# Patient Record
Sex: MALE | Race: WHITE | Employment: OTHER | URBAN - METROPOLITAN AREA
[De-identification: names, ages, dates, MRNs, and addresses within clinical notes are randomized per-mention and may not be internally consistent; named-entity substitution may affect disease eponyms.]

---

## 2018-07-06 ENCOUNTER — HOSPITAL ENCOUNTER (OUTPATIENT)
Age: 83
Discharge: HOME | End: 2018-07-06
Payer: MEDICARE

## 2018-07-06 LAB
ALBUMIN SERPL-MCNC: 3.9 G/DL (ref 3.5–5.2)
ALP BLD-CCNC: 80 U/L (ref 40–129)
ALT SERPL-CCNC: 14 U/L (ref 0–40)
ANION GAP SERPL CALCULATED.3IONS-SCNC: 8 MMOL/L (ref 7–16)
AST SERPL-CCNC: 19 U/L (ref 0–39)
BILIRUB SERPL-MCNC: 0.6 MG/DL (ref 0–1.2)
BUN BLDV-MCNC: 16 MG/DL (ref 8–23)
CALCIUM SERPL-MCNC: 9.5 MG/DL (ref 8.6–10.2)
CHLORIDE BLD-SCNC: 102 MMOL/L (ref 98–107)
CHOLESTEROL, FASTING: 158 MG/DL (ref 0–199)
CO2: 32 MMOL/L (ref 22–29)
CREAT SERPL-MCNC: 1 MG/DL (ref 0.7–1.2)
GFR AFRICAN AMERICAN: >60
GFR NON-AFRICAN AMERICAN: >60 ML/MIN/1.73
GLUCOSE FASTING: 90 MG/DL (ref 74–109)
HDLC SERPL-MCNC: 66 MG/DL
LDL CHOLESTEROL CALCULATED: 77 MG/DL (ref 0–99)
POTASSIUM SERPL-SCNC: 4.5 MMOL/L (ref 3.5–5)
PROSTATE SPECIFIC ANTIGEN: 2.65 NG/ML (ref 0–4)
SODIUM BLD-SCNC: 142 MMOL/L (ref 132–146)
TOTAL PROTEIN: 7 G/DL (ref 6.4–8.3)
TRIGLYCERIDE, FASTING: 75 MG/DL (ref 0–149)
VLDLC SERPL CALC-MCNC: 15 MG/DL

## 2018-07-06 PROCEDURE — 80053 COMPREHEN METABOLIC PANEL: CPT

## 2018-07-06 PROCEDURE — 36415 COLL VENOUS BLD VENIPUNCTURE: CPT

## 2018-07-06 PROCEDURE — G0103 PSA SCREENING: HCPCS

## 2018-07-06 PROCEDURE — 80061 LIPID PANEL: CPT

## 2019-02-14 ENCOUNTER — HOSPITAL ENCOUNTER (OUTPATIENT)
Dept: PULMONOLOGY | Age: 84
Discharge: HOME | End: 2019-02-14
Payer: MEDICARE

## 2019-02-14 PROCEDURE — 94060 EVALUATION OF WHEEZING: CPT

## 2019-02-14 PROCEDURE — 94729 DIFFUSING CAPACITY: CPT

## 2019-02-14 PROCEDURE — 94726 PLETHYSMOGRAPHY LUNG VOLUMES: CPT

## 2019-05-14 ENCOUNTER — HOSPITAL ENCOUNTER (OUTPATIENT)
Age: 84
Discharge: HOME | End: 2019-05-16
Payer: MEDICARE

## 2019-05-14 PROCEDURE — 82785 ASSAY OF IGE: CPT

## 2019-05-14 PROCEDURE — 86003 ALLG SPEC IGE CRUDE XTRC EA: CPT

## 2019-05-22 LAB
Lab: NORMAL
REPORT: NORMAL
THIS TEST SENT TO: NORMAL

## 2019-08-09 ENCOUNTER — HOSPITAL ENCOUNTER (OUTPATIENT)
Age: 84
Discharge: HOME | End: 2019-08-11
Payer: MEDICARE

## 2019-08-09 LAB
ALBUMIN SERPL-MCNC: 4 G/DL (ref 3.5–5.2)
ALP BLD-CCNC: 79 U/L (ref 40–129)
ALT SERPL-CCNC: 14 U/L (ref 0–40)
ANION GAP SERPL CALCULATED.3IONS-SCNC: 9 MMOL/L (ref 7–16)
AST SERPL-CCNC: 19 U/L (ref 0–39)
BASOPHILS ABSOLUTE: 0.03 E9/L (ref 0–0.2)
BASOPHILS RELATIVE PERCENT: 0.7 % (ref 0–2)
BILIRUB SERPL-MCNC: 0.5 MG/DL (ref 0–1.2)
BUN BLDV-MCNC: 13 MG/DL (ref 8–23)
CALCIUM SERPL-MCNC: 9.4 MG/DL (ref 8.6–10.2)
CHLORIDE BLD-SCNC: 102 MMOL/L (ref 98–107)
CO2: 30 MMOL/L (ref 22–29)
CREAT SERPL-MCNC: 1 MG/DL (ref 0.7–1.2)
EOSINOPHILS ABSOLUTE: 0.25 E9/L (ref 0.05–0.5)
EOSINOPHILS RELATIVE PERCENT: 5.9 % (ref 0–6)
GFR AFRICAN AMERICAN: >60
GFR NON-AFRICAN AMERICAN: >60 ML/MIN/1.73
GLUCOSE BLD-MCNC: 93 MG/DL (ref 74–99)
HCT VFR BLD CALC: 49.8 % (ref 37–54)
HEMOGLOBIN: 16.1 G/DL (ref 12.5–16.5)
IMMATURE GRANULOCYTES #: 0.02 E9/L
IMMATURE GRANULOCYTES %: 0.5 % (ref 0–5)
LYMPHOCYTES ABSOLUTE: 1.33 E9/L (ref 1.5–4)
LYMPHOCYTES RELATIVE PERCENT: 31.2 % (ref 20–42)
MCH RBC QN AUTO: 28.6 PG (ref 26–35)
MCHC RBC AUTO-ENTMCNC: 32.3 % (ref 32–34.5)
MCV RBC AUTO: 88.5 FL (ref 80–99.9)
MONOCYTES ABSOLUTE: 0.37 E9/L (ref 0.1–0.95)
MONOCYTES RELATIVE PERCENT: 8.7 % (ref 2–12)
NEUTROPHILS ABSOLUTE: 2.26 E9/L (ref 1.8–7.3)
NEUTROPHILS RELATIVE PERCENT: 53 % (ref 43–80)
PDW BLD-RTO: 13.7 FL (ref 11.5–15)
PLATELET # BLD: 144 E9/L (ref 130–450)
PMV BLD AUTO: 11.3 FL (ref 7–12)
POTASSIUM SERPL-SCNC: 4.3 MMOL/L (ref 3.5–5)
RBC # BLD: 5.63 E12/L (ref 3.8–5.8)
SODIUM BLD-SCNC: 141 MMOL/L (ref 132–146)
TOTAL PROTEIN: 7 G/DL (ref 6.4–8.3)
TSH SERPL DL<=0.05 MIU/L-ACNC: 1.73 UIU/ML (ref 0.27–4.2)
WBC # BLD: 4.3 E9/L (ref 4.5–11.5)

## 2019-08-09 PROCEDURE — 80053 COMPREHEN METABOLIC PANEL: CPT

## 2019-08-09 PROCEDURE — 36415 COLL VENOUS BLD VENIPUNCTURE: CPT

## 2019-08-09 PROCEDURE — 84443 ASSAY THYROID STIM HORMONE: CPT

## 2019-08-09 PROCEDURE — 85025 COMPLETE CBC W/AUTO DIFF WBC: CPT

## 2019-11-18 ENCOUNTER — HOSPITAL ENCOUNTER (EMERGENCY)
Age: 84
Discharge: HOME | End: 2019-11-18
Payer: MEDICARE

## 2019-11-18 VITALS
RESPIRATION RATE: 18 BRPM | HEART RATE: 64 BPM | HEIGHT: 68 IN | BODY MASS INDEX: 23.64 KG/M2 | DIASTOLIC BLOOD PRESSURE: 80 MMHG | OXYGEN SATURATION: 95 % | TEMPERATURE: 98.1 F | WEIGHT: 156 LBS | SYSTOLIC BLOOD PRESSURE: 150 MMHG

## 2019-11-18 DIAGNOSIS — T14.8XXA ABRASION: Primary | ICD-10-CM

## 2019-11-18 PROCEDURE — 90471 IMMUNIZATION ADMIN: CPT | Performed by: NURSE PRACTITIONER

## 2019-11-18 PROCEDURE — 99213 OFFICE O/P EST LOW 20 MIN: CPT

## 2019-11-18 PROCEDURE — 96372 THER/PROPH/DIAG INJ SC/IM: CPT

## 2019-11-18 PROCEDURE — 6360000002 HC RX W HCPCS: Performed by: NURSE PRACTITIONER

## 2019-11-18 PROCEDURE — 90715 TDAP VACCINE 7 YRS/> IM: CPT | Performed by: NURSE PRACTITIONER

## 2019-11-18 RX ORDER — FLUTICASONE PROPIONATE 110 UG/1
1 AEROSOL, METERED RESPIRATORY (INHALATION) DAILY PRN
COMMUNITY
End: 2020-01-13

## 2019-11-18 RX ADMIN — TETANUS TOXOID, REDUCED DIPHTHERIA TOXOID AND ACELLULAR PERTUSSIS VACCINE, ADSORBED 0.5 ML: 5; 2.5; 8; 8; 2.5 SUSPENSION INTRAMUSCULAR at 17:28

## 2019-12-28 NOTE — PROGRESS NOTES
capsule by mouth 2 times daily. Once a month      fluticasone (FLONASE) 50 MCG/ACT nasal spray 1 spray by Nasal route as needed.  aspirin 81 MG EC tablet Take 81 mg by mouth daily. **says he only takes 3x/wk       No current facility-administered medications for this visit. Allergies as of 01/13/2020 - Review Complete 01/13/2020   Allergen Reaction Noted    Other  01/30/2015    Sulfites Other (See Comments) 01/08/2013       Social History     Socioeconomic History    Marital status:      Spouse name: Not on file    Number of children: Not on file    Years of education: Not on file    Highest education level: Not on file   Occupational History    Not on file   Social Needs    Financial resource strain: Not on file    Food insecurity:     Worry: Not on file     Inability: Not on file    Transportation needs:     Medical: Not on file     Non-medical: Not on file   Tobacco Use    Smoking status: Former Smoker     Types: Cigarettes    Smokeless tobacco: Never Used    Tobacco comment: Only smoked occ in teen yrs.    Substance and Sexual Activity    Alcohol use: Yes     Comment: occasional.  2 cups of coffee a day     Drug use: No    Sexual activity: Not Currently     Partners: Female   Lifestyle    Physical activity:     Days per week: Not on file     Minutes per session: Not on file    Stress: Not on file   Relationships    Social connections:     Talks on phone: Not on file     Gets together: Not on file     Attends Spiritism service: Not on file     Active member of club or organization: Not on file     Attends meetings of clubs or organizations: Not on file     Relationship status: Not on file    Intimate partner violence:     Fear of current or ex partner: Not on file     Emotionally abused: Not on file     Physically abused: Not on file     Forced sexual activity: Not on file   Other Topics Concern    Not on file   Social History Narrative    Not on file       History reviewed. No pertinent family history. REVIEW OF SYSTEMS:     CONSTITUTIONAL:  negative for  fevers, chills, sweats and fatigue  HEENT:  negative for  tinnitus, earaches, nasal congestion and epistaxis  RESPIRATORY:  negative for  dry cough, cough with sputum, dyspnea, wheezing and hemoptysis  GASTROINTESTINAL:  negative for nausea, vomiting, diarrhea, constipation, pruritus and jaundice  HEMATOLOGIC/LYMPHATIC:  negative for easy bruising, bleeding, lymphadenopathy and petechiae  ENDOCRINE:  negative for heat intolerance, cold intolerance, tremor, hair loss and diabetic symptoms including neither polyuria nor polydipsia nor blurred vision  MUSCULOSKELETAL:  negative for  myalgias, arthralgias, joint swelling, stiff joints and decreased range of motion  NEUROLOGICAL:  negative for memory problems, speech problems, visual disturbance, dysphagia, weakness and numbness      PHYSICAL EXAM:   Constitutional:  Awake, alert cooperative, no apparent distress, and appears stated age. HEENT:  Moist and pink mucous membranes, normocephalic, without obvious abnormality, atraumatic, normal ears and nose. Neck:  Supple, symmetrical, trachea midline, no JVD, no adenopathy, thyroid symmetric, not enlarged and no tenderness, good carotid upstroke bilaterally, no carotid bruit, skin normal  Lungs: No increased work of breathing, decreased air exchange, bilateral wheezing  Cardiovascular: Normal apical impulse, regular rate and rhythm with extra beat, normal S1 and S2, no S3 or S4, no murmur, no pedal edema, good carotid upstroke bilaterally, no carotid bruit, no JVD, no abdominal pulsating masses. Abdomen: Soft, nontender, no hepatomegaly, no splenomegaly, bowel sound positive. CHEST:  Expands symmetrically, nontender to palpation. Musculoskeletal:  No clubbing or cyanosis. No redness, warmth, or swelling of the joints.   Neurological: Alert, awake, and oriented X3. ]  /60 (Site: Right Upper Arm, Position: Sitting, Cuff Size: Medium Adult)   Pulse 66   Ht 5' 7\" (1.702 m)   Wt 157 lb (71.2 kg)   BMI 24.59 kg/m²     DATA:   EKG (11/1/18) Sinus rhythm with PAC'S, baseline artifacts, no significant changes when compared to previous. I personally reviewed the KUB done on 1/20/2013 with the following interpretation: Sinus rhythm with occasional PACs    ECHO: 1/10/13 Summary     Left Ventricle    Measured ejection fraction is 70 %. Mitral Valve    Physiologic and/or trace mitral regurgitation is present. Conclusions       Summary    echocardiogram relatively unremarkable. Stress Test: 4/13/15  IMPRESSION:    No scintigraphic evidence of exercise induced left   ventricular myocardial ischemia.       Findings: Left ventricular myocardial contractibility was   evaluated as part of SPECT cardiac imaging. Left ventricular   chamber size is normal. Myocardial motion is unremarkable.       IMPRESSION: Unremarkable study.       Findings: SPECT gated images of the left ventricular myocardium   were obtained for purposes of left ventricular ejection fraction   determination.  Left ventricular ejection fraction is calculated   at 67%.                            Angiography: Not performed to date  Cardiology Labs:   BMP:    Lab Results   Component Value Date     08/09/2019    K 4.3 08/09/2019     08/09/2019    CO2 30 08/09/2019    BUN 13 08/09/2019     CMP:    Lab Results   Component Value Date     08/09/2019    K 4.3 08/09/2019     08/09/2019    CO2 30 08/09/2019    BUN 13 08/09/2019    PROT 7.0 08/09/2019     CBC:    Lab Results   Component Value Date    WBC 4.3 08/09/2019    RBC 5.63 08/09/2019    HGB 16.1 08/09/2019    HCT 49.8 08/09/2019    MCV 88.5 08/09/2019    RDW 13.7 08/09/2019     08/09/2019     PT/INR:  No results found for: PTINR  PT/INR Warfarin:  No components found for: PTPATWAR, PTINRWAR  PTT:    Lab Results   Component Value Date    APTT 27.3 01/09/2013     PTT Heparin:  No components found for: APTTHEP  Magnesium:    Lab Results   Component Value Date    MG 1.9 01/10/2013     TSH:    Lab Results   Component Value Date    TSH 1.730 08/09/2019     TROPONIN:  No components found for: TROP  BNP:  No results found for: BNP  FASTING LIPID PANEL:    Lab Results   Component Value Date    CHOL 194 12/07/2016    HDL 66 07/06/2018    TRIG 118 12/07/2016     No orders to display     I have personally reviewed the laboratory, cardiac diagnostic and radiographic testing as outlined above:      IMPRESSION: 1. Atrial premature depolarization Chronic, asymptomatic, continue current treatment                         2.  Essential (primary) hypertension Controlled, continue current medications                           3.  Abnormal electrocardiogram (EKG) With left anterior fascicular block      RECOMMENDATIONS:   1. Continue current treatment  2. Increase physical activities, low-salt low-cholesterol diet will advise  3. Follow-up with Dr. Yahir Diaz as scheduled  4. Follow-up with Dr. Martine Salinas in 1 year sooner if symptomatic for any reason    I have reviewed my findings and recommendations with patient    Electronically signed by Cash Vizcarra MD on 1/13/2020 at 8:57 AM  NOTE: This report was transcribed using voice recognition software.  Every effort was made to ensure accuracy; however, inadvertent computerized transcription errors may be present

## 2020-01-13 ENCOUNTER — OFFICE VISIT (OUTPATIENT)
Dept: CARDIOLOGY CLINIC | Age: 85
End: 2020-01-13
Payer: MEDICARE

## 2020-01-13 VITALS
DIASTOLIC BLOOD PRESSURE: 60 MMHG | SYSTOLIC BLOOD PRESSURE: 124 MMHG | WEIGHT: 157 LBS | HEIGHT: 67 IN | BODY MASS INDEX: 24.64 KG/M2 | HEART RATE: 66 BPM

## 2020-01-13 PROCEDURE — 93000 ELECTROCARDIOGRAM COMPLETE: CPT | Performed by: INTERNAL MEDICINE

## 2020-01-13 PROCEDURE — 99213 OFFICE O/P EST LOW 20 MIN: CPT | Performed by: INTERNAL MEDICINE

## 2020-01-13 RX ORDER — FLUTICASONE PROPIONATE 100 MCG
BLISTER, WITH INHALATION DEVICE INHALATION
COMMUNITY
Start: 2019-12-18

## 2020-06-22 ENCOUNTER — HOSPITAL ENCOUNTER (OUTPATIENT)
Age: 85
Setting detail: OBSERVATION
Discharge: HOME | End: 2020-06-24
Attending: EMERGENCY MEDICINE | Admitting: INTERNAL MEDICINE
Payer: MEDICARE

## 2020-06-22 ENCOUNTER — APPOINTMENT (OUTPATIENT)
Dept: CT IMAGING | Age: 85
End: 2020-06-22
Payer: MEDICARE

## 2020-06-22 PROBLEM — K92.2 GI BLEED: Status: ACTIVE | Noted: 2020-06-22

## 2020-06-22 LAB
ABO/RH: NORMAL
ANION GAP SERPL CALCULATED.3IONS-SCNC: 8 MMOL/L (ref 7–16)
ANTIBODY SCREEN: NORMAL
APTT: 28.2 SEC (ref 24.5–35.1)
BASOPHILS ABSOLUTE: 0.02 E9/L (ref 0–0.2)
BASOPHILS RELATIVE PERCENT: 0.4 % (ref 0–2)
BUN BLDV-MCNC: 20 MG/DL (ref 8–23)
CALCIUM SERPL-MCNC: 8.7 MG/DL (ref 8.6–10.2)
CHLORIDE BLD-SCNC: 105 MMOL/L (ref 98–107)
CO2: 28 MMOL/L (ref 22–29)
CREAT SERPL-MCNC: 0.9 MG/DL (ref 0.7–1.2)
EOSINOPHILS ABSOLUTE: 0.21 E9/L (ref 0.05–0.5)
EOSINOPHILS RELATIVE PERCENT: 4.6 % (ref 0–6)
GFR AFRICAN AMERICAN: >60
GFR NON-AFRICAN AMERICAN: >60 ML/MIN/1.73
GLUCOSE BLD-MCNC: 83 MG/DL (ref 74–99)
HCT VFR BLD CALC: 37.1 % (ref 37–54)
HEMOGLOBIN: 12.2 G/DL (ref 12.5–16.5)
IMMATURE GRANULOCYTES #: 0.02 E9/L
IMMATURE GRANULOCYTES %: 0.4 % (ref 0–5)
INR BLD: 1.2
LYMPHOCYTES ABSOLUTE: 1.39 E9/L (ref 1.5–4)
LYMPHOCYTES RELATIVE PERCENT: 30.3 % (ref 20–42)
MCH RBC QN AUTO: 29.3 PG (ref 26–35)
MCHC RBC AUTO-ENTMCNC: 32.9 % (ref 32–34.5)
MCV RBC AUTO: 89 FL (ref 80–99.9)
MONOCYTES ABSOLUTE: 0.4 E9/L (ref 0.1–0.95)
MONOCYTES RELATIVE PERCENT: 8.7 % (ref 2–12)
NEUTROPHILS ABSOLUTE: 2.55 E9/L (ref 1.8–7.3)
NEUTROPHILS RELATIVE PERCENT: 55.6 % (ref 43–80)
PDW BLD-RTO: 13.3 FL (ref 11.5–15)
PLATELET # BLD: 124 E9/L (ref 130–450)
PMV BLD AUTO: 10 FL (ref 7–12)
POTASSIUM SERPL-SCNC: 3.9 MMOL/L (ref 3.5–5)
PROTHROMBIN TIME: 13.9 SEC (ref 9.3–12.4)
RBC # BLD: 4.17 E12/L (ref 3.8–5.8)
SODIUM BLD-SCNC: 141 MMOL/L (ref 132–146)
TROPONIN: <0.01 NG/ML (ref 0–0.03)
WBC # BLD: 4.6 E9/L (ref 4.5–11.5)

## 2020-06-22 PROCEDURE — 80048 BASIC METABOLIC PNL TOTAL CA: CPT

## 2020-06-22 PROCEDURE — 6360000002 HC RX W HCPCS: Performed by: INTERNAL MEDICINE

## 2020-06-22 PROCEDURE — 36415 COLL VENOUS BLD VENIPUNCTURE: CPT

## 2020-06-22 PROCEDURE — 86850 RBC ANTIBODY SCREEN: CPT

## 2020-06-22 PROCEDURE — 93005 ELECTROCARDIOGRAM TRACING: CPT | Performed by: EMERGENCY MEDICINE

## 2020-06-22 PROCEDURE — 86901 BLOOD TYPING SEROLOGIC RH(D): CPT

## 2020-06-22 PROCEDURE — 74177 CT ABD & PELVIS W/CONTRAST: CPT

## 2020-06-22 PROCEDURE — 85610 PROTHROMBIN TIME: CPT

## 2020-06-22 PROCEDURE — 85730 THROMBOPLASTIN TIME PARTIAL: CPT

## 2020-06-22 PROCEDURE — 99285 EMERGENCY DEPT VISIT HI MDM: CPT

## 2020-06-22 PROCEDURE — 86900 BLOOD TYPING SEROLOGIC ABO: CPT

## 2020-06-22 PROCEDURE — G0378 HOSPITAL OBSERVATION PER HR: HCPCS

## 2020-06-22 PROCEDURE — 6360000004 HC RX CONTRAST MEDICATION: Performed by: RADIOLOGY

## 2020-06-22 PROCEDURE — 2580000003 HC RX 258: Performed by: INTERNAL MEDICINE

## 2020-06-22 PROCEDURE — 84484 ASSAY OF TROPONIN QUANT: CPT

## 2020-06-22 PROCEDURE — 96374 THER/PROPH/DIAG INJ IV PUSH: CPT

## 2020-06-22 PROCEDURE — 85025 COMPLETE CBC W/AUTO DIFF WBC: CPT

## 2020-06-22 PROCEDURE — C9113 INJ PANTOPRAZOLE SODIUM, VIA: HCPCS | Performed by: INTERNAL MEDICINE

## 2020-06-22 PROCEDURE — 99220 PR INITIAL OBSERVATION CARE/DAY 70 MINUTES: CPT | Performed by: INTERNAL MEDICINE

## 2020-06-22 RX ORDER — IPRATROPIUM BROMIDE AND ALBUTEROL SULFATE 2.5; .5 MG/3ML; MG/3ML
1 SOLUTION RESPIRATORY (INHALATION)
Status: DISCONTINUED | OUTPATIENT
Start: 2020-06-23 | End: 2020-06-24 | Stop reason: HOSPADM

## 2020-06-22 RX ORDER — ACETAMINOPHEN 325 MG/1
650 TABLET ORAL EVERY 6 HOURS PRN
Status: DISCONTINUED | OUTPATIENT
Start: 2020-06-22 | End: 2020-06-24 | Stop reason: HOSPADM

## 2020-06-22 RX ORDER — ONDANSETRON 2 MG/ML
4 INJECTION INTRAMUSCULAR; INTRAVENOUS EVERY 6 HOURS PRN
Status: DISCONTINUED | OUTPATIENT
Start: 2020-06-22 | End: 2020-06-24 | Stop reason: HOSPADM

## 2020-06-22 RX ORDER — ACETAMINOPHEN 650 MG/1
650 SUPPOSITORY RECTAL EVERY 6 HOURS PRN
Status: DISCONTINUED | OUTPATIENT
Start: 2020-06-22 | End: 2020-06-24 | Stop reason: HOSPADM

## 2020-06-22 RX ORDER — SODIUM CHLORIDE 9 MG/ML
INJECTION, SOLUTION INTRAVENOUS CONTINUOUS
Status: DISCONTINUED | OUTPATIENT
Start: 2020-06-22 | End: 2020-06-22

## 2020-06-22 RX ORDER — SODIUM CHLORIDE 0.9 % (FLUSH) 0.9 %
10 SYRINGE (ML) INJECTION PRN
Status: DISCONTINUED | OUTPATIENT
Start: 2020-06-22 | End: 2020-06-24 | Stop reason: HOSPADM

## 2020-06-22 RX ORDER — PROMETHAZINE HYDROCHLORIDE 25 MG/1
12.5 TABLET ORAL EVERY 6 HOURS PRN
Status: DISCONTINUED | OUTPATIENT
Start: 2020-06-22 | End: 2020-06-24 | Stop reason: HOSPADM

## 2020-06-22 RX ORDER — POLYETHYLENE GLYCOL 3350 17 G/17G
17 POWDER, FOR SOLUTION ORAL DAILY PRN
Status: DISCONTINUED | OUTPATIENT
Start: 2020-06-22 | End: 2020-06-24 | Stop reason: HOSPADM

## 2020-06-22 RX ORDER — SODIUM CHLORIDE 0.9 % (FLUSH) 0.9 %
10 SYRINGE (ML) INJECTION EVERY 12 HOURS SCHEDULED
Status: DISCONTINUED | OUTPATIENT
Start: 2020-06-22 | End: 2020-06-24 | Stop reason: HOSPADM

## 2020-06-22 RX ORDER — PANTOPRAZOLE SODIUM 40 MG/10ML
40 INJECTION, POWDER, LYOPHILIZED, FOR SOLUTION INTRAVENOUS 2 TIMES DAILY
Status: DISCONTINUED | OUTPATIENT
Start: 2020-06-22 | End: 2020-06-24 | Stop reason: HOSPADM

## 2020-06-22 RX ADMIN — PANTOPRAZOLE SODIUM 40 MG: 40 INJECTION, POWDER, FOR SOLUTION INTRAVENOUS at 22:26

## 2020-06-22 RX ADMIN — SODIUM CHLORIDE: 9 INJECTION, SOLUTION INTRAVENOUS at 22:26

## 2020-06-22 RX ADMIN — SODIUM CHLORIDE, PRESERVATIVE FREE 10 ML: 5 INJECTION INTRAVENOUS at 22:26

## 2020-06-22 RX ADMIN — IOPAMIDOL 70 ML: 755 INJECTION, SOLUTION INTRAVENOUS at 18:25

## 2020-06-22 ASSESSMENT — ENCOUNTER SYMPTOMS
ABDOMINAL PAIN: 0
CHEST TIGHTNESS: 0
SHORTNESS OF BREATH: 0
ANAL BLEEDING: 1
BLOOD IN STOOL: 1

## 2020-06-22 ASSESSMENT — PAIN SCALES - GENERAL: PAINLEVEL_OUTOF10: 0

## 2020-06-22 NOTE — ED PROVIDER NOTES
66-year-old male presenting with rectal bleeding for the last few days. He has had several episodes of bleeding and then started to have dark tarry stools consistent with melena. He says that he has had a prior episode of this years ago, but does not take any blood thinning medications. No chest pain, no shortness of breath, no lightheadedness, no abdominal pain, no nausea or vomiting. He is resting comfortably, in no distress. Last colonoscopy was about 5 to 6 years ago. Family History   Problem Relation Age of Onset    No Known Problems Mother     No Known Problems Father      Past Surgical History:   Procedure Laterality Date    APPENDECTOMY      CARDIOVASCULAR STRESS TEST  1/8/13    sees dr Tala Farias yearly was seen due to vertigo    CATARACT REMOVAL WITH IMPLANT Right 02/03/15    DOPPLER ECHOCARDIOGRAPHY  1/10/12    NASAL SEPTUM SURGERY      PRE-MALIGNANT / BENIGN SKIN LESION EXCISION      TONSILLECTOMY      TONSILLECTOMY AND ADENOIDECTOMY         Review of Systems   Constitutional: Negative for chills and fever. Respiratory: Negative for chest tightness and shortness of breath. Cardiovascular: Negative for chest pain. Gastrointestinal: Positive for anal bleeding and blood in stool. Negative for abdominal pain. All other systems reviewed and are negative. Physical Exam  Constitutional:       General: He is not in acute distress. Appearance: He is well-developed. HENT:      Head: Normocephalic and atraumatic. Eyes:      Pupils: Pupils are equal, round, and reactive to light. Neck:      Musculoskeletal: Normal range of motion and neck supple. Thyroid: No thyromegaly. Cardiovascular:      Rate and Rhythm: Normal rate and regular rhythm. Pulmonary:      Effort: Pulmonary effort is normal. No respiratory distress. Breath sounds: Normal breath sounds. No wheezing. Abdominal:      General: There is no distension. Palpations: Abdomen is soft. EKG 12 Lead   Result Value Ref Range    Ventricular Rate 56 BPM    Atrial Rate 56 BPM    P-R Interval 220 ms    QRS Duration 90 ms    Q-T Interval 440 ms    QTc Calculation (Bazett) 424 ms    P Axis 72 degrees    R Axis 27 degrees    T Axis 61 degrees   EKG 12 Lead   Result Value Ref Range    Ventricular Rate 91 BPM    Atrial Rate 91 BPM    P-R Interval 218 ms    QRS Duration 86 ms    Q-T Interval 368 ms    QTc Calculation (Bazett) 452 ms    P Axis 77 degrees    R Axis -16 degrees    T Axis 84 degrees   TYPE AND SCREEN   Result Value Ref Range    ABO/Rh A POS     Antibody Screen NEG        RADIOLOGY:  CT ABDOMEN PELVIS W IV CONTRAST Additional Contrast? None   Final Result   1. No CT evidence of an acute intra-abdominal or intrapelvic process. 2.  No findings to suggest acute appendicitis; no ureter calculus or   hydronephrosis. 3.  Diverticulosis coli without CT evidence of acute diverticulitis. 4. Mild calcific atherosclerosis aorta without aneurysm. ------------------------- NURSING NOTES AND VITALS REVIEWED ---------------------------  Date / Time Roomed:  6/22/2020  4:27 PM  ED Bed Assignment:  [id number]    The nursing notes within the ED encounter and vital signs as below have been reviewed. Patient Vitals for the past 24 hrs:   BP Temp Temp src Pulse Resp SpO2 Weight   06/24/20 0600 125/76 98 °F (36.7 °C) Oral 72 16 95 % --   06/23/20 2350 -- -- -- -- -- -- 152 lb 3.2 oz (69 kg)   06/23/20 1807 131/66 98.4 °F (36.9 °C) Oral 75 16 96 % --       Oxygen Saturation Interpretation: Normal    ------------------------------------------ PROGRESS NOTES ------------------------------------------  Re-evaluation(s):  Patients symptoms show no change  Repeat physical examination is not changed    Counseling:  I have spoken with the patient and discussed todays results, in addition to providing specific details for the plan of care and counseling regarding the diagnosis and prognosis.   Their

## 2020-06-23 PROBLEM — R10.13 EPIGASTRIC ABDOMINAL PAIN: Status: ACTIVE | Noted: 2020-06-23

## 2020-06-23 PROBLEM — D62 ACUTE BLOOD LOSS ANEMIA: Status: ACTIVE | Noted: 2020-06-23

## 2020-06-23 PROBLEM — I44.0 FIRST DEGREE AV BLOCK: Status: ACTIVE | Noted: 2020-06-23

## 2020-06-23 LAB
BASOPHILS ABSOLUTE: 0.04 E9/L (ref 0–0.2)
BASOPHILS RELATIVE PERCENT: 0.9 % (ref 0–2)
EKG ATRIAL RATE: 56 BPM
EKG ATRIAL RATE: 91 BPM
EKG P AXIS: 72 DEGREES
EKG P AXIS: 77 DEGREES
EKG P-R INTERVAL: 218 MS
EKG P-R INTERVAL: 220 MS
EKG Q-T INTERVAL: 368 MS
EKG Q-T INTERVAL: 440 MS
EKG QRS DURATION: 86 MS
EKG QRS DURATION: 90 MS
EKG QTC CALCULATION (BAZETT): 424 MS
EKG QTC CALCULATION (BAZETT): 452 MS
EKG R AXIS: -16 DEGREES
EKG R AXIS: 27 DEGREES
EKG T AXIS: 61 DEGREES
EKG T AXIS: 84 DEGREES
EKG VENTRICULAR RATE: 56 BPM
EKG VENTRICULAR RATE: 91 BPM
EOSINOPHILS ABSOLUTE: 0.29 E9/L (ref 0.05–0.5)
EOSINOPHILS RELATIVE PERCENT: 6.5 % (ref 0–6)
HCT VFR BLD CALC: 36 % (ref 37–54)
HCT VFR BLD CALC: 36.4 % (ref 37–54)
HCT VFR BLD CALC: 40.9 % (ref 37–54)
HCT VFR BLD CALC: 41.6 % (ref 37–54)
HEMOGLOBIN: 11.6 G/DL (ref 12.5–16.5)
HEMOGLOBIN: 11.7 G/DL (ref 12.5–16.5)
HEMOGLOBIN: 12.9 G/DL (ref 12.5–16.5)
HEMOGLOBIN: 13.4 G/DL (ref 12.5–16.5)
IMMATURE GRANULOCYTES #: 0.01 E9/L
IMMATURE GRANULOCYTES %: 0.2 % (ref 0–5)
IMMATURE RETIC FRACT: 8.4 % (ref 2.3–13.4)
LYMPHOCYTES ABSOLUTE: 1.49 E9/L (ref 1.5–4)
LYMPHOCYTES RELATIVE PERCENT: 33.3 % (ref 20–42)
MCH RBC QN AUTO: 28.5 PG (ref 26–35)
MCHC RBC AUTO-ENTMCNC: 32.1 % (ref 32–34.5)
MCV RBC AUTO: 88.6 FL (ref 80–99.9)
MONOCYTES ABSOLUTE: 0.41 E9/L (ref 0.1–0.95)
MONOCYTES RELATIVE PERCENT: 9.2 % (ref 2–12)
NEUTROPHILS ABSOLUTE: 2.23 E9/L (ref 1.8–7.3)
NEUTROPHILS RELATIVE PERCENT: 49.9 % (ref 43–80)
PDW BLD-RTO: 13.3 FL (ref 11.5–15)
PLATELET # BLD: 127 E9/L (ref 130–450)
PMV BLD AUTO: 10.7 FL (ref 7–12)
RBC # BLD: 4.11 E12/L (ref 3.8–5.8)
RETIC HGB EQUIVALENT: 32.4 PG (ref 28.2–36.6)
RETICULOCYTE ABSOLUTE COUNT: 0.05 E12/L
RETICULOCYTE COUNT PCT: 1.2 % (ref 0.4–1.9)
TROPONIN: <0.01 NG/ML (ref 0–0.03)
TROPONIN: <0.01 NG/ML (ref 0–0.03)
WBC # BLD: 4.5 E9/L (ref 4.5–11.5)

## 2020-06-23 PROCEDURE — G0378 HOSPITAL OBSERVATION PER HR: HCPCS

## 2020-06-23 PROCEDURE — 36415 COLL VENOUS BLD VENIPUNCTURE: CPT

## 2020-06-23 PROCEDURE — 6370000000 HC RX 637 (ALT 250 FOR IP): Performed by: INTERNAL MEDICINE

## 2020-06-23 PROCEDURE — 85018 HEMOGLOBIN: CPT

## 2020-06-23 PROCEDURE — C9113 INJ PANTOPRAZOLE SODIUM, VIA: HCPCS | Performed by: INTERNAL MEDICINE

## 2020-06-23 PROCEDURE — 93010 ELECTROCARDIOGRAM REPORT: CPT | Performed by: INTERNAL MEDICINE

## 2020-06-23 PROCEDURE — 94640 AIRWAY INHALATION TREATMENT: CPT

## 2020-06-23 PROCEDURE — 85045 AUTOMATED RETICULOCYTE COUNT: CPT

## 2020-06-23 PROCEDURE — 96376 TX/PRO/DX INJ SAME DRUG ADON: CPT

## 2020-06-23 PROCEDURE — 85014 HEMATOCRIT: CPT

## 2020-06-23 PROCEDURE — 84484 ASSAY OF TROPONIN QUANT: CPT

## 2020-06-23 PROCEDURE — 93005 ELECTROCARDIOGRAM TRACING: CPT | Performed by: INTERNAL MEDICINE

## 2020-06-23 PROCEDURE — 99226 PR SBSQ OBSERVATION CARE/DAY 35 MINUTES: CPT | Performed by: NURSE PRACTITIONER

## 2020-06-23 PROCEDURE — 85025 COMPLETE CBC W/AUTO DIFF WBC: CPT

## 2020-06-23 PROCEDURE — 6360000002 HC RX W HCPCS: Performed by: INTERNAL MEDICINE

## 2020-06-23 PROCEDURE — 2580000003 HC RX 258: Performed by: INTERNAL MEDICINE

## 2020-06-23 RX ADMIN — IPRATROPIUM BROMIDE AND ALBUTEROL SULFATE 1 AMPULE: .5; 3 SOLUTION RESPIRATORY (INHALATION) at 07:19

## 2020-06-23 RX ADMIN — IPRATROPIUM BROMIDE AND ALBUTEROL SULFATE 1 AMPULE: .5; 3 SOLUTION RESPIRATORY (INHALATION) at 11:02

## 2020-06-23 RX ADMIN — PANTOPRAZOLE SODIUM 40 MG: 40 INJECTION, POWDER, FOR SOLUTION INTRAVENOUS at 09:29

## 2020-06-23 RX ADMIN — IPRATROPIUM BROMIDE AND ALBUTEROL SULFATE 1 AMPULE: .5; 3 SOLUTION RESPIRATORY (INHALATION) at 20:17

## 2020-06-23 RX ADMIN — SODIUM CHLORIDE, PRESERVATIVE FREE 10 ML: 5 INJECTION INTRAVENOUS at 20:39

## 2020-06-23 RX ADMIN — PANTOPRAZOLE SODIUM 40 MG: 40 INJECTION, POWDER, FOR SOLUTION INTRAVENOUS at 20:39

## 2020-06-23 RX ADMIN — IPRATROPIUM BROMIDE AND ALBUTEROL SULFATE 1 AMPULE: .5; 3 SOLUTION RESPIRATORY (INHALATION) at 13:22

## 2020-06-23 RX ADMIN — SODIUM CHLORIDE, PRESERVATIVE FREE 10 ML: 5 INJECTION INTRAVENOUS at 09:29

## 2020-06-23 ASSESSMENT — PAIN SCALES - GENERAL: PAINLEVEL_OUTOF10: 0

## 2020-06-23 NOTE — H&P
[address] [place], [place] [address]ist Group   History and Physical      CHIEF COMPLAINT: Red muddy bowel movements    History of Present Illness:  80 y.o. male with a history of thrombocytopenia, bradycardia, BPH and boderline asthma presents with red muddy bowel movements for 4 days. Since this Saturday (4 days ago), patient started to have red muddy bowel movement every 4 hours. Also accompanied by mild epigastric abdominal pain. Denies nausea vomiting. The bleeding became less frequent. He noticed no accelerating or relieving factors. Denies dizziness, chest pain, shortness of breath. Denies fever or chills. He occasionally take Aleve and baby aspirin at home. Last OTC Aleve took was last Friday. He said he took a baby aspirin once a week. He had EGD but had a colonoscopy in 2014 due to fresh red blood per rectum. His GI doctor is Dr. Dimitrios Camacho with whom he have an appointment with this Wednesday. In the ED, he was hemodynamically stable, temperature 98.5, heart heart rate 70, blood pressure 124/62, respiratory rate 16, SPO2 95 on room air. Labs significant for hemoglobin 12.2 with baseline of 16.1 (8/9/2019), thrombocytopenia with platelet counts 541, with baseline of 144. CT abd showed   1.  No CT evidence of an acute intra-abdominal or intrapelvic process. 2.  No findings to suggest acute appendicitis; no ureter calculus or  hydronephrosis. 3.  Diverticulosis coli without CT evidence of acute diverticulitis. 4. Mild calcific atherosclerosis aorta without aneurysm    Informant(s) for H&P: Patient and chart review    REVIEW OF SYSTEMS:  no fevers, chills, cp, sob, n/v, ha, vision/hearing changes, wt changes, hot/cold flashes, other open skin lesions, diarrhea, constipation, dysuria/hematuria unless noted in HPI. Complete ROS performed with the patient and is otherwise negative.       PMH:  Past Medical History:   Diagnosis Date    Arthritis of neck     Asthma     Borderline for asthma mother. PHYSICAL EXAM:  Vitals:  BP (!) 153/85   Pulse 79   Temp 97.7 °F (36.5 °C) (Oral)   Resp 18   Ht 5' 7\" (1.702 m)   Wt 155 lb 6 oz (70.5 kg)   SpO2 94%   BMI 24.34 kg/m²     General Appearance: alert and oriented to person, place and time and in no acute distress  Skin: warm and dry  Head: normocephalic and atraumatic  Eyes: pupils equal, round, and reactive to light, extraocular eye movements intact, conjunctivae normal  Neck: neck supple and non tender without mass   Pulmonary/Chest: clear to auscultation bilaterally- no wheezes, rales or rhonchi, normal air movement, no respiratory distress  Cardiovascular: normal rate, normal S1 and S2 and no carotid bruits  Abdomen: soft, mild tenderness on palpation epigastric area, non-distended, normal bowel sounds, no masses or organomegaly  Extremities: no cyanosis, no clubbing,2+ pitting edema bl ankle  Neurologic: no cranial nerve deficit and speech normal    LABS:  Recent Labs     06/22/20  1740      K 3.9      CO2 28   BUN 20   CREATININE 0.9   GLUCOSE 83   CALCIUM 8.7       Recent Labs     06/22/20  1740 06/23/20  0022   WBC 4.6  --    RBC 4.17  --    HGB 12.2* 11.6*   HCT 37.1 36.0*   MCV 89.0  --    MCH 29.3  --    MCHC 32.9  --    RDW 13.3  --    *  --    MPV 10.0  --        No results for input(s): POCGLU in the last 72 hours.     CBC with Differential:    Lab Results   Component Value Date    WBC 4.6 06/22/2020    RBC 4.17 06/22/2020    HGB 11.6 06/23/2020    HCT 36.0 06/23/2020     06/22/2020    MCV 89.0 06/22/2020    MCH 29.3 06/22/2020    MCHC 32.9 06/22/2020    RDW 13.3 06/22/2020    SEGSPCT 79 01/11/2013    LYMPHOPCT 30.3 06/22/2020    MONOPCT 8.7 06/22/2020    BASOPCT 0.4 06/22/2020    MONOSABS 0.40 06/22/2020    LYMPHSABS 1.39 06/22/2020    EOSABS 0.21 06/22/2020    BASOSABS 0.02 06/22/2020     CMP:    Lab Results   Component Value Date     06/22/2020    K 3.9 06/22/2020     06/22/2020    CO2 28 06/22/2020    BUN 20 06/22/2020    CREATININE 0.9 06/22/2020    GFRAA >60 06/22/2020    LABGLOM >60 06/22/2020    GLUCOSE 83 06/22/2020    GLUCOSE 80 09/23/2011    PROT 7.0 08/09/2019    LABALBU 4.0 08/09/2019    CALCIUM 8.7 06/22/2020    BILITOT 0.5 08/09/2019    ALKPHOS 79 08/09/2019    AST 19 08/09/2019    ALT 14 08/09/2019       Radiology: Ct Abdomen Pelvis W Iv Contrast Additional Contrast? None    Result Date: 6/22/2020  EXAMINATION: CT OF THE ABDOMEN AND PELVIS WITH CONTRAST 6/22/2020 6:21 pm TECHNIQUE: CT of the abdomen and pelvis was performed with the administration of intravenous contrast. Multiplanar reformatted images are provided for review. Dose modulation, iterative reconstruction, and/or weight based adjustment of the mA/kV was utilized to reduce the radiation dose to as low as reasonably achievable. COMPARISON: CT abdomen 07/07/2005 HISTORY: Acute mid abdomen and pelvis pain, rectal bleeding, melena FINDINGS: Lower Chest: Visualized portions of the lungs are clear. Cardiac and posterior mediastinal structures visualized are unremarkable. Organs: Normal attenuation throughout the liver. No discrete hepatic lesion or intrahepatic bile duct dilatation is seen. The gallbladder, kidneys, spleen, adrenal glands and pancreas appear unremarkable. GI/Bowel: Multifocal diverticula involve the descending and sigmoid colon without evidence of acute inflammatory change. No diffuse or focal bowel wall thickening evident. No inflammatory changes evident. No obstruction is seen. The appendix is visualized right lower quadrant, unremarkable in appearance. Pelvis: Prostate gland and seminal vesicles appear unremarkable. Urinary bladder is partially filled, unremarkable appearance. No adenopathy or free fluid. Peritoneum/Retroperitoneum: Mild calcific atherosclerosis, otherwise unremarkable appearance of the aorta. No aneurysm. Unremarkable appearance of the IVC. No adenopathy or fluid.  Bones/Soft Tissues: No acute superficial soft tissue or osseous structure abnormality evident. 1.  No CT evidence of an acute intra-abdominal or intrapelvic process. 2.  No findings to suggest acute appendicitis; no ureter calculus or hydronephrosis. 3.  Diverticulosis coli without CT evidence of acute diverticulitis. 4. Mild calcific atherosclerosis aorta without aneurysm. EKG: Sinus bradycardia with marked sinus arrhythmia with 1st degree AV block  Septal infarct , age undetermined  Abnormal ECG  No previous ECGs available    ASSESSMENT:      Active Problems: Thrombocytopenia (HCC)    GI bleed    Epigastric abdominal pain    First degree AV block    Acute blood loss anemia  Resolved Problems:    * No resolved hospital problems. *      PLAN:    1. GI bleed, suspect upper GI bleed, due to NSAID and ASA use, PPI BID, monitor hemoglobin H&H every 6 hour, transfuse if hemoglobin less than 7, or acutely dropping 2 units with symptoms. Have an appointment Colleen Asif this Wednesday. Consulted GI consulted. 2. Acute blood loss anemia, secondary to GI bleed, manage as above, obtain reticular count next a.m.  3. Epigastric abdominal pain, PPI BID. 4. First-degree AV block LA interval 220, septal infarct age undetermined, monitor on telemetry, trend troponin x3, repeat EKG next a.m.  5. Chronic thrombocytopenia, platelet count 227 on presentation, with baseline of platelet 198, monitor CBC. Code Status: Full   DVT prophylaxis: PCDs    NOTE: This report was transcribed using voice recognition software.  Every effort was made to ensure accuracy; however, inadvertent computerized transcription errors may be present.     Electronically signed by Pallavi Paulino MD on 6/23/2020 at 1:21 AM

## 2020-06-23 NOTE — PROGRESS NOTES
[facility] Quality Flow/Interdisciplinary Rounds Progress Note        Quality Flow Rounds held on June 23, 2020    Disciplines Attending:  Bedside Nurse, ,  and Nursing Unit Leadership    Leighann Batista was admitted on 6/22/2020  4:27 PM    Anticipated Discharge Date:  Expected Discharge Date: 06/23/20    Disposition:    Ari Score:  Ari Scale Score: 22    Readmission Risk              Risk of Unplanned Readmission:        0           Discussed patient goal for the day, patient clinical progression, and barriers to discharge.   The following Goal(s) of the Day/Commitment(s) have been identified:  Awaiting input from [facility]  June 23, 2020

## 2020-06-24 VITALS
RESPIRATION RATE: 16 BRPM | HEIGHT: 67 IN | OXYGEN SATURATION: 95 % | HEART RATE: 72 BPM | DIASTOLIC BLOOD PRESSURE: 76 MMHG | WEIGHT: 152.2 LBS | SYSTOLIC BLOOD PRESSURE: 125 MMHG | BODY MASS INDEX: 23.89 KG/M2 | TEMPERATURE: 98 F

## 2020-06-24 LAB
ACANTHOCYTES: ABNORMAL
BASOPHILS ABSOLUTE: 0 E9/L (ref 0–0.2)
BASOPHILS RELATIVE PERCENT: 0.7 % (ref 0–2)
BURR CELLS: ABNORMAL
EOSINOPHILS ABSOLUTE: 0.15 E9/L (ref 0.05–0.5)
EOSINOPHILS RELATIVE PERCENT: 3.5 % (ref 0–6)
HCT VFR BLD CALC: 33.3 % (ref 37–54)
HCT VFR BLD CALC: 34.1 % (ref 37–54)
HEMOGLOBIN: 10.8 G/DL (ref 12.5–16.5)
HEMOGLOBIN: 11.3 G/DL (ref 12.5–16.5)
LYMPHOCYTES ABSOLUTE: 1.03 E9/L (ref 1.5–4)
LYMPHOCYTES RELATIVE PERCENT: 23.5 % (ref 20–42)
MCH RBC QN AUTO: 29.2 PG (ref 26–35)
MCHC RBC AUTO-ENTMCNC: 33.1 % (ref 32–34.5)
MCV RBC AUTO: 88.1 FL (ref 80–99.9)
MONOCYTES ABSOLUTE: 0.26 E9/L (ref 0.1–0.95)
MONOCYTES RELATIVE PERCENT: 6.1 % (ref 2–12)
NEUTROPHILS ABSOLUTE: 2.88 E9/L (ref 1.8–7.3)
NEUTROPHILS RELATIVE PERCENT: 67 % (ref 43–80)
OVALOCYTES: ABNORMAL
PDW BLD-RTO: 13.3 FL (ref 11.5–15)
PLATELET # BLD: 127 E9/L (ref 130–450)
PMV BLD AUTO: 10.6 FL (ref 7–12)
POIKILOCYTES: ABNORMAL
POLYCHROMASIA: ABNORMAL
RBC # BLD: 3.87 E12/L (ref 3.8–5.8)
WBC # BLD: 4.3 E9/L (ref 4.5–11.5)

## 2020-06-24 PROCEDURE — 85025 COMPLETE CBC W/AUTO DIFF WBC: CPT

## 2020-06-24 PROCEDURE — 99217 PR OBSERVATION CARE DISCHARGE MANAGEMENT: CPT | Performed by: INTERNAL MEDICINE

## 2020-06-24 PROCEDURE — APPSS30 APP SPLIT SHARED TIME 16-30 MINUTES: Performed by: NURSE PRACTITIONER

## 2020-06-24 PROCEDURE — 85018 HEMOGLOBIN: CPT

## 2020-06-24 PROCEDURE — 96376 TX/PRO/DX INJ SAME DRUG ADON: CPT

## 2020-06-24 PROCEDURE — C9113 INJ PANTOPRAZOLE SODIUM, VIA: HCPCS | Performed by: INTERNAL MEDICINE

## 2020-06-24 PROCEDURE — G0378 HOSPITAL OBSERVATION PER HR: HCPCS

## 2020-06-24 PROCEDURE — 6360000002 HC RX W HCPCS: Performed by: INTERNAL MEDICINE

## 2020-06-24 PROCEDURE — 36415 COLL VENOUS BLD VENIPUNCTURE: CPT

## 2020-06-24 PROCEDURE — 2580000003 HC RX 258: Performed by: INTERNAL MEDICINE

## 2020-06-24 PROCEDURE — 85014 HEMATOCRIT: CPT

## 2020-06-24 RX ADMIN — SODIUM CHLORIDE, PRESERVATIVE FREE 10 ML: 5 INJECTION INTRAVENOUS at 08:01

## 2020-06-24 RX ADMIN — PANTOPRAZOLE SODIUM 40 MG: 40 INJECTION, POWDER, FOR SOLUTION INTRAVENOUS at 07:59

## 2020-06-24 ASSESSMENT — PAIN SCALES - GENERAL: PAINLEVEL_OUTOF10: 0

## 2020-06-24 NOTE — PLAN OF CARE
Problem: Falls - Risk of:  Goal: Will remain free from falls  Description: Will remain free from falls  6/23/2020 2111 by Amanda Vilchis RN  Outcome: Met This Shift     Problem: Falls - Risk of:  Goal: Absence of physical injury  Description: Absence of physical injury  Outcome: Met This Shift     Problem: Discharge Planning:  Goal: Discharged to appropriate level of care  Description: Discharged to appropriate level of care  Outcome: Met This Shift     Problem:  Bowel Function - Altered:  Goal: Bowel elimination is within specified parameters  Description: Bowel elimination is within specified parameters  6/23/2020 2111 by Amanda Vilchis RN  Outcome: Met This Shift     Problem: Fluid Volume - Imbalance:  Goal: Will show no signs and symptoms of excessive bleeding  Description: Will show no signs and symptoms of excessive bleeding  Outcome: Met This Shift     Problem: Nausea/Vomiting:  Goal: Absence of nausea/vomiting  Description: Absence of nausea/vomiting  Outcome: Met This Shift     Problem: Nausea/Vomiting:  Goal: Able to drink  Description: Able to drink  Outcome: Met This Shift

## 2020-06-24 NOTE — PLAN OF CARE
Problem: Falls - Risk of:  Goal: Will remain free from falls  Description: Will remain free from falls  6/24/2020 0818 by Honorio Thomson RN  Outcome: Completed     Problem: Falls - Risk of:  Goal: Absence of physical injury  Description: Absence of physical injury  6/24/2020 0818 by Honorio Thomson RN  Outcome: Completed     Problem: Discharge Planning:  Goal: Discharged to appropriate level of care  Description: Discharged to appropriate level of care  6/24/2020 0818 by Honorio Thomson RN  Outcome: Completed     Problem:  Bowel Function - Altered:  Goal: Bowel elimination is within specified parameters  Description: Bowel elimination is within specified parameters  6/24/2020 0818 by Honorio Thomson RN  Outcome: Completed     Problem: Fluid Volume - Imbalance:  Goal: Will show no signs and symptoms of excessive bleeding  Description: Will show no signs and symptoms of excessive bleeding  6/24/2020 0818 by Honorio Thomson RN  Outcome: Completed     Problem: Fluid Volume - Imbalance:  Goal: Will show no signs and symptoms of excessive bleeding  Description: Will show no signs and symptoms of excessive bleeding  6/24/2020 0818 by Honorio Thomson RN  Outcome: Completed     Problem: Nausea/Vomiting:  Goal: Absence of nausea/vomiting  Description: Absence of nausea/vomiting  6/24/2020 0818 by Honorio Thomson RN  Outcome: Completed     Problem: Nausea/Vomiting:  Goal: Able to drink  Description: Able to drink  6/24/2020 0818 by Honorio Thomson RN  Outcome: Completed

## 2020-06-24 NOTE — CONSULTS
[address]                                  CONSULTATION    PATIENT NAME: Mariola Luong                    :        1932  MED REC NO:   [id number]                            ROOM:       0321  ACCOUNT NO:   [de-identified]                           ADMIT DATE: 2020  PROVIDER:     Shirley Morton    CONSULT DATE:  2020    PMD:  House doctor, Dr. Mayo Gupta. REASON FOR CONSULTATION:  Rectal bleeding. HISTORY OF PRESENT ILLNESS:  The patient is an 71-year-old male who was  known to have past medical history of asthma, BPH, thrombocytopenia,  diverticulosis of the colon with hemorrhoids, and colonic AVM, admitted  to the hospital complaining of rectal bleeding. Red muddy stool for the  last four days. He reported he used over-the-counter Preparation H  Suppositories and bleeding improved. His blood count has been stable  since his admission around 12-12.7 level. He does take aspirin and  Aleve twice a week maximum. He denies any other NSAID intake. He  denies any abdominal pain. He denies nausea or vomiting. He denies any  diarrhea or constipation. He denies any melena. He wanted to go home  today and be discharged to be able to help his wife with his son. I was  told in the morning that the consult was placed last night around  midnight. After reviewing his record and his blood count is stable, I  advised that the patient can be discharged home to see his GI doctor,  Dr. Jennifer Siegel. The patient has an appointment with Dr. Emery Michaels  tomorrow. The patient was not discharged home and he is still in the  hospital.  When he was asked about his opinion about being discharged,  he expressed the opinion _____ can go home tonight or tomorrow morning. PAST MEDICAL HISTORY:  As above.     PAST SURGICAL HISTORY:  Deviated septum, appendectomy, skin lesions  benign, tonsillectomy, and adenoidectomy. MEDICATIONS AT HOME:  He is on Flovent, omega-3, Flonase, Theragran,  baby aspirin, and Aleve but he takes only twice a week. ALLERGIES:  No medication allergies. SOCIAL HISTORY:  Ex-smoker. He denies any alcohol or drug abuse. FAMILY HISTORY:  Noncontributory. REVIEW OF SYSTEMS:  All systems reviewed, unrevealing except per  history. PHYSICAL EXAMINATION:  VITAL SIGNS:  Blood pressure 131/73, pulse 77, respiration 18, and  temperature 97.7. GENERAL:  Awake and oriented x3, in no acute distress. HEENT:  Normocephalic, atraumatic. Pupils are equal and reactive to  light. Extraocular muscles are intact. Conjunctivae are injected. Sclerae are anicteric. NECK:  Supple. No palpable cervical lymphadenopathy. No palpable  thyromegaly. HEART:  S1, S2. No murmur, no gallop. LUNGS:  Clear to air bilaterally. No wheeze. No crackles. ABDOMEN:  Soft, benign. Positive bowel sounds. No rebound. No  tenderness. EXTREMITIES:  No edema. Positive pulse. SKIN:  No ecchymosis, no cyanosis, and no clubbing. LABORATORY DATA:  Hemoglobin and hematocrit 12.9 and 41.6. It was  earlier today at 11.6/36 and on admission yesterday it was 12.2/37. 1. WBC 4.5, platelet count 788, was 124 on admission. INR 1.2 with PT  13.9. Sodium 141, potassium 3.9. BUN 20, creatinine 0.9. Glucose 83,  calcium 8.7. CT of the abdomen performed yesterday revealed no CT evidence of acute  intra-abdominal or intrapelvic process. Diverticulosis with no evidence  of acute diverticulitis. ASSESSMENT AND PLAN:  An 78-year-old male admitted to the hospital with  few days history of rectal bleeding. He reports that he used  Preparation H Suppositories at home, it helps his bleeding. Colonoscopy  few years ago revealed that he has diverticulosis and hemorrhoids and  colonic AVM, which was cauterized at that time.   Rectal bleeding may be  related to underlying diverticular bleed, though less likely

## 2021-01-17 NOTE — PROGRESS NOTES
Cardiology Progress Note  Dr. Rachna Fofana      Referring Physician: No referring provider defined for this encounter. CHIEF COMPLAINT:   Chief Complaint   Patient presents with    Hypertension     Annual- pt has complaints of swelling in feet and legs       History Obtained From: patient  HISTORY OF PRESENT ILLNESS:   Patient is 80year old male with history of PACs, presented today for annual follow-up. Occasional pedal edema, patient denies any chest pain, no shortness of breath, no lightheadedness, no dizziness, no palpitations, no PND, no orthopnea, no syncope, no presyncopal episodes. Functional capacity is at baseline      Past Medical History:   Diagnosis Date    Arthritis of neck     Asthma     Borderline for asthma    BPH (benign prostatic hyperplasia)     Bradycardia 1/8/13    resolved    Cholelithiasis     History of exercise stress test 4-13-15    nuclear    History of stress test 01/10/2013    Labyrinthitis     with vertiginous symptomatology    Rotator cuff tear     Right    S/P transesophageal echocardiogram (ANN) 01/09/2013    S/P transesophageal echocardiogram (ANN) [id number]    Thrombocytopenia ([address]     MILD         Past Surgical History:   Procedure Laterality Date    APPENDECTOMY      CARDIOVASCULAR STRESS TEST  1/8/13    sees dr Beto Kam yearly was seen due to vertigo    CATARACT REMOVAL WITH IMPLANT Right 02/03/15    DOPPLER ECHOCARDIOGRAPHY  1/10/12    NASAL SEPTUM SURGERY      PRE-MALIGNANT / BENIGN SKIN LESION EXCISION      TONSILLECTOMY      TONSILLECTOMY AND ADENOIDECTOMY           Current Outpatient Medications   Medication Sig Dispense Refill    Naproxen Sodium (ALEVE) 220 MG CAPS Take by mouth as needed for Pain      FLOVENT DISKUS 100 MCG/BLIST AEPB inhaler       albuterol sulfate  (90 BASE) MCG/ACT inhaler Inhale 2 puffs into the lungs every 6 hours as needed for Wheezing      multivitamin (THERAGRAN) per tablet Take 1 tablet by mouth daily.       fluticasone (FLONASE) 50 MCG/ACT nasal spray 1 spray by Nasal route as needed.  Omega-3 Fatty Acids (OMEGA 3 PO) Take 1 capsule by mouth 2 times daily. Once a month       No current facility-administered medications for this visit. Allergies as of 01/19/2021 - Review Complete 01/19/2021   Allergen Reaction Noted    Other  01/30/2015       Social History     Socioeconomic History    Marital status:      Spouse name: Not on file    Number of children: Not on file    Years of education: Not on file    Highest education level: Not on file   Occupational History    Not on file   Social Needs    Financial resource strain: Not on file    Food insecurity     Worry: Not on file     Inability: Not on file    Transportation needs     Medical: Not on file     Non-medical: Not on file   Tobacco Use    Smoking status: Former Smoker     Types: Cigarettes    Smokeless tobacco: Never Used    Tobacco comment: Only smoked occ in teen yrs.    Substance and Sexual Activity    Alcohol use: Yes     Comment: occasional.  2 cups of coffee a day     Drug use: No    Sexual activity: Not Currently     Partners: Female   Lifestyle    Physical activity     Days per week: Not on file     Minutes per session: Not on file    Stress: Not on file   Relationships    Social connections     Talks on phone: Not on file     Gets together: Not on file     Attends Sabianist service: Not on file     Active member of club or organization: Not on file     Attends meetings of clubs or organizations: Not on file     Relationship status: Not on file    Intimate partner violence     Fear of current or ex partner: Not on file     Emotionally abused: Not on file     Physically abused: Not on file     Forced sexual activity: Not on file   Other Topics Concern    Not on file   Social History Narrative    Not on file       Family History   Problem Relation Age of Onset    No Known Problems Mother     No Known Problems Father REVIEW OF SYSTEMS:     CONSTITUTIONAL:  negative for  fevers, chills, sweats and fatigue  HEENT:  negative for  tinnitus, earaches, nasal congestion and epistaxis  RESPIRATORY:  negative for  dry cough, cough with sputum, dyspnea, wheezing and hemoptysis  GASTROINTESTINAL:  negative for nausea, vomiting, diarrhea, constipation, pruritus and jaundice  HEMATOLOGIC/LYMPHATIC:  negative for easy bruising, bleeding, lymphadenopathy and petechiae  ENDOCRINE:  negative for heat intolerance, cold intolerance, tremor, hair loss and diabetic symptoms including neither polyuria nor polydipsia nor blurred vision  MUSCULOSKELETAL:  negative for  myalgias, arthralgias, joint swelling, stiff joints and decreased range of motion  NEUROLOGICAL:  negative for memory problems, speech problems, visual disturbance, dysphagia, weakness and numbness      PHYSICAL EXAM:   Constitutional:  Awake, alert cooperative, no apparent distress, and appears stated age. HEENT:  Moist and pink mucous membranes, normocephalic, without obvious abnormality, atraumatic, normal ears and nose. Neck:  Supple, symmetrical, trachea midline, no JVD, no adenopathy, thyroid symmetric, not enlarged and no tenderness, good carotid upstroke bilaterally, no carotid bruit, skin normal  Lungs: No increased work of breathing, decreased air exchange, bilateral wheezing  Cardiovascular: Normal apical impulse, regular rate and rhythm with extra beat, normal S1 and S2, no S3 or S4, no murmur, no pedal edema, good carotid upstroke bilaterally, no carotid bruit, no JVD, no abdominal pulsating masses. Abdomen: Soft, nontender, no hepatomegaly, no splenomegaly, bowel sound positive. CHEST:  Expands symmetrically, nontender to palpation. Musculoskeletal:  No clubbing or cyanosis. No redness, warmth, or swelling of the joints.   Neurological: Alert, awake, and oriented X3. ]  /70   Pulse 60   Resp 18   Ht 5' 7\" (1.702 m)   Wt 153 lb (69.4 kg)   BMI 23.96 kg/m²     DATA: I have personally reviewed the EKG with the following interpretation: Sinus rhythm, left anterior fascicular block, old septal wall MI age undetermined. EKG 6/23/20 Sinus rhythm with 1st degree AV block  Septal infarct (cited on or before 22-JUN-2020)  Abnormal ECG  When compared with ECG of 22-JUN-2020 17:34,  Vent. rate has increased BY  35 BPM  T wave amplitude has increased in Inferior leads    ECHO: 1/10/13 Summary     Left Ventricle    Measured ejection fraction is 70 %. Mitral Valve    Physiologic and/or trace mitral regurgitation is present. Conclusions       Summary    echocardiogram relatively unremarkable. Stress Test: 4/13/15  IMPRESSION:    No scintigraphic evidence of exercise induced left   ventricular myocardial ischemia.       Findings: Left ventricular myocardial contractibility was   evaluated as part of SPECT cardiac imaging. Left ventricular   chamber size is normal. Myocardial motion is unremarkable.       IMPRESSION: Unremarkable study.       Findings: SPECT gated images of the left ventricular myocardium   were obtained for purposes of left ventricular ejection fraction   determination.  Left ventricular ejection fraction is calculated   at 67%.                Angiography: Not performed to date  Cardiology Labs:   BMP:    Lab Results   Component Value Date     06/22/2020    K 3.9 06/22/2020     06/22/2020    CO2 28 06/22/2020    BUN 20 06/22/2020     CMP:    Lab Results   Component Value Date     06/22/2020    K 3.9 06/22/2020     06/22/2020    CO2 28 06/22/2020    BUN 20 06/22/2020    PROT 7.0 08/09/2019     CBC:    Lab Results   Component Value Date    WBC 4.3 06/24/2020    RBC 3.87 06/24/2020    HGB 11.3 06/24/2020    HCT 34.1 06/24/2020    MCV 88.1 06/24/2020    RDW 13.3 06/24/2020     06/24/2020     PT/INR:  No results found for: PTINR  PT/INR Warfarin:  No components found for: PTPATWAR, PTINRWAR  PTT:    Lab Results   Component Value Date    APTT 28.2 06/22/2020     PTT Heparin:  No components found for: APTTHEP  Magnesium:    Lab Results   Component Value Date    MG 1.9 01/10/2013     TSH:    Lab Results   Component Value Date    TSH 1.730 08/09/2019     TROPONIN:  No components found for: TROP  BNP:  No results found for: BNP  FASTING LIPID PANEL:    Lab Results   Component Value Date    CHOL 194 12/07/2016    HDL 66 07/06/2018    TRIG 118 12/07/2016     No orders to display     I have personally reviewed the laboratory, cardiac diagnostic and radiographic testing as outlined above:      IMPRESSION:   1. Atrial premature depolarization Chronic, asymptomatic, continue current treatment                           2.  Essential (primary) hypertension Controlled, continue current medications                             3.  Abnormal electrocardiogram (EKG) With left anterior fascicular block      RECOMMENDATIONS:   1. Continue current treatment  2. Increase physical activities, low-salt low-cholesterol diet will advise  3. Follow-up with Dr. Jarrod Alcantara as scheduled  4. Follow-up with Dr. Erica Gomez in 1 year sooner if symptomatic for any reason    I have reviewed my findings and recommendations with patient    Electronically signed by Yaritza Mckeon MD on 2/28/2021 at 6:09 PM  NOTE: This report was transcribed using voice recognition software.  Every effort was made to ensure accuracy; however, inadvertent computerized transcription errors may be present

## 2021-01-19 ENCOUNTER — OFFICE VISIT (OUTPATIENT)
Dept: CARDIOLOGY CLINIC | Age: 86
End: 2021-01-19
Payer: MEDICARE

## 2021-01-19 VITALS
DIASTOLIC BLOOD PRESSURE: 70 MMHG | BODY MASS INDEX: 24.01 KG/M2 | RESPIRATION RATE: 18 BRPM | WEIGHT: 153 LBS | HEIGHT: 67 IN | HEART RATE: 60 BPM | SYSTOLIC BLOOD PRESSURE: 136 MMHG

## 2021-01-19 DIAGNOSIS — I10 ESSENTIAL HYPERTENSION: Primary | ICD-10-CM

## 2021-01-19 PROCEDURE — G8427 DOCREV CUR MEDS BY ELIG CLIN: HCPCS | Performed by: INTERNAL MEDICINE

## 2021-01-19 PROCEDURE — 93000 ELECTROCARDIOGRAM COMPLETE: CPT | Performed by: INTERNAL MEDICINE

## 2021-01-19 PROCEDURE — G8420 CALC BMI NORM PARAMETERS: HCPCS | Performed by: INTERNAL MEDICINE

## 2021-01-19 PROCEDURE — 99213 OFFICE O/P EST LOW 20 MIN: CPT | Performed by: INTERNAL MEDICINE

## 2021-01-19 PROCEDURE — 1123F ACP DISCUSS/DSCN MKR DOCD: CPT | Performed by: INTERNAL MEDICINE

## 2021-01-19 PROCEDURE — G8484 FLU IMMUNIZE NO ADMIN: HCPCS | Performed by: INTERNAL MEDICINE

## 2021-01-19 PROCEDURE — 1036F TOBACCO NON-USER: CPT | Performed by: INTERNAL MEDICINE

## 2021-01-19 PROCEDURE — 4040F PNEUMOC VAC/ADMIN/RCVD: CPT | Performed by: INTERNAL MEDICINE

## 2021-01-19 RX ORDER — COVID-19 ANTIGEN TEST
KIT MISCELLANEOUS PRN
COMMUNITY

## 2021-11-30 ENCOUNTER — PREP FOR PROCEDURE (OUTPATIENT)
Dept: OPHTHALMOLOGY | Age: 86
End: 2021-11-30

## 2021-11-30 RX ORDER — KETOROLAC TROMETHAMINE 5 MG/ML
1 SOLUTION OPHTHALMIC SEE ADMIN INSTRUCTIONS
Status: CANCELLED | OUTPATIENT
Start: 2021-11-30

## 2021-11-30 RX ORDER — TROPICAMIDE 10 MG/ML
1 SOLUTION/ DROPS OPHTHALMIC SEE ADMIN INSTRUCTIONS
Status: CANCELLED | OUTPATIENT
Start: 2021-11-30

## 2021-11-30 RX ORDER — SODIUM CHLORIDE 9 MG/ML
25 INJECTION, SOLUTION INTRAVENOUS PRN
Status: CANCELLED | OUTPATIENT
Start: 2021-11-30

## 2021-11-30 RX ORDER — SODIUM CHLORIDE 0.9 % (FLUSH) 0.9 %
10 SYRINGE (ML) INJECTION PRN
Status: CANCELLED | OUTPATIENT
Start: 2021-11-30

## 2021-11-30 RX ORDER — SODIUM CHLORIDE 0.9 % (FLUSH) 0.9 %
10 SYRINGE (ML) INJECTION EVERY 12 HOURS SCHEDULED
Status: CANCELLED | OUTPATIENT
Start: 2021-11-30

## 2021-11-30 RX ORDER — PHENYLEPHRINE HYDROCHLORIDE 100 MG/ML
1 SOLUTION/ DROPS OPHTHALMIC SEE ADMIN INSTRUCTIONS
Status: CANCELLED | OUTPATIENT
Start: 2021-11-30

## 2021-11-30 RX ORDER — PROPARACAINE HYDROCHLORIDE 5 MG/ML
1 SOLUTION/ DROPS OPHTHALMIC SEE ADMIN INSTRUCTIONS
Status: CANCELLED | OUTPATIENT
Start: 2021-11-30

## 2021-12-07 ENCOUNTER — ANESTHESIA EVENT (OUTPATIENT)
Dept: OPERATING ROOM | Age: 86
End: 2021-12-07
Payer: MEDICARE

## 2021-12-07 ASSESSMENT — LIFESTYLE VARIABLES: SMOKING_STATUS: 0

## 2021-12-07 NOTE — ANESTHESIA PRE PROCEDURE
Department of Anesthesiology  Preprocedure Note       Name:  Tre Rey   Age:  80 y.o.  :  1932                                          MRN:  [id number]         Date:  2021      Surgeon: Jessie Mohs):  Claudia Garcia DO    Procedure: Procedure(s):  CATARACT EXTRACTION WITH A INTRAOCULAR LENS IMPLANT OF LEFT EYE    Medications prior to admission:   Prior to Admission medications    Medication Sig Start Date End Date Taking? Authorizing Provider   Naproxen Sodium (ALEVE) 220 MG CAPS Take by mouth as needed for Pain   Yes Historical Provider, MD   FLOVENT DISKUS 100 MCG/BLIST AEPB inhaler  19  Yes Historical Provider, MD   albuterol sulfate  (90 BASE) MCG/ACT inhaler Inhale 2 puffs into the lungs every 6 hours as needed for Wheezing   Yes Historical Provider, MD   fluticasone (FLONASE) 50 MCG/ACT nasal spray 1 spray by Nasal route as needed. Yes Historical Provider, MD       Current medications:    No current facility-administered medications for this encounter. Current Outpatient Medications   Medication Sig Dispense Refill    Naproxen Sodium (ALEVE) 220 MG CAPS Take by mouth as needed for Pain      FLOVENT DISKUS 100 MCG/BLIST AEPB inhaler       albuterol sulfate  (90 BASE) MCG/ACT inhaler Inhale 2 puffs into the lungs every 6 hours as needed for Wheezing      fluticasone (FLONASE) 50 MCG/ACT nasal spray 1 spray by Nasal route as needed. Allergies:     Allergies   Allergen Reactions    Other       dustmite mold some gum chewing causes sore       Problem List:    Patient Active Problem List   Diagnosis Code    Shoulder pain M25.519    Rotator cuff tear M75.100    Shoulder impingement M75.40    Symptomatic bradycardia R00.1    Thrombocytopenia (HCC) D69.6    Labyrinthitis H83.09    Right cataract H26.9    GI bleed K92.2    Epigastric abdominal pain R10.13    First degree AV block I44.0    Acute blood loss anemia D62       Past Medical History: Diagnosis Date    Arthritis of neck     Asthma     Borderline for asthma    BPH (benign prostatic hyperplasia)     Bradycardia 01/08/2013    resolved; Dr. Callahan Cons last visit 1-21    Bronchitis     Cholelithiasis     History of exercise stress test 4-13-15    nuclear    History of stress test 01/10/2013    Labyrinthitis     with vertiginous symptomatology    Rotator cuff tear     Right    S/P transesophageal echocardiogram (ANN) 01/09/2013    S/P transesophageal echocardiogram (ANN) [id number]    Thrombocytopenia ([address]     MILD       Past Surgical History:        Procedure Laterality Date    CARDIOVASCULAR STRESS TEST  1/8/13    sees dr Callahan Cons yearly was seen due to vertigo    CATARACT REMOVAL WITH IMPLANT Right 02/03/15    DOPPLER ECHOCARDIOGRAPHY  1/10/12    NASAL SEPTUM SURGERY      PRE-MALIGNANT / BENIGN SKIN LESION EXCISION      TONSILLECTOMY      TONSILLECTOMY AND ADENOIDECTOMY         Social History:    Social History     Tobacco Use    Smoking status: Never Smoker    Smokeless tobacco: Never Used   Substance Use Topics    Alcohol use: Yes     Comment: 1 beer per week                                Counseling given: Not Answered      Vital Signs (Current):   Vitals:    12/03/21 1307   Weight: 148 lb (67.1 kg)   Height: 5' 8\" (1.727 m)                                              BP Readings from Last 3 Encounters:   01/19/21 136/70   06/24/20 125/76   01/13/20 124/60       NPO Status:                                                                                 BMI:   Wt Readings from Last 3 Encounters:   01/19/21 153 lb (69.4 kg)   06/23/20 152 lb 3.2 oz (69 kg)   01/13/20 157 lb (71.2 kg)     Body mass index is 22.5 kg/m².     CBC:   Lab Results   Component Value Date    WBC 4.3 06/24/2020    RBC 3.87 06/24/2020    HGB 11.3 06/24/2020    HCT 34.1 06/24/2020    MCV 88.1 06/24/2020    RDW 13.3 06/24/2020     06/24/2020       CMP:   Lab Results   Component Value Date    NA 141 06/22/2020    K 3.9 06/22/2020     06/22/2020    CO2 28 06/22/2020    BUN 20 06/22/2020    CREATININE 0.9 06/22/2020    GFRAA >60 06/22/2020    LABGLOM >60 06/22/2020    GLUCOSE 83 06/22/2020    GLUCOSE 80 09/23/2011    PROT 7.0 08/09/2019    CALCIUM 8.7 06/22/2020    BILITOT 0.5 08/09/2019    ALKPHOS 79 08/09/2019    AST 19 08/09/2019    ALT 14 08/09/2019       POC Tests: No results for input(s): POCGLU, POCNA, POCK, POCCL, POCBUN, POCHEMO, POCHCT in the last 72 hours. Coags:   Lab Results   Component Value Date    PROTIME 13.9 06/22/2020    INR 1.2 06/22/2020    APTT 28.2 06/22/2020       HCG (If Applicable): No results found for: PREGTESTUR, PREGSERUM, HCG, HCGQUANT     ABGs: No results found for: PHART, PO2ART, SCB8PKQ, QNE3DUN, BEART, G4AEOZXI     Type & Screen (If Applicable):  No results found for: LABABO, LABRH    Drug/Infectious Status (If Applicable):  No results found for: HIV, HEPCAB    COVID-19 Screening (If Applicable): No results found for: COVID19        Anesthesia Evaluation  Patient summary reviewed no history of anesthetic complications:   Airway:         Dental:          Pulmonary:   (+) asthma:     (-) not a current smoker                           Cardiovascular:          ECG reviewed                     ROS comment: First degree block on EKG     Neuro/Psych:   Negative Neuro/Psych ROS              GI/Hepatic/Renal:            ROS comment: BPH (benign prostatic hyperplasia). Endo/Other:    (+) : arthritis: OA., .                 Abdominal:             Vascular: negative vascular ROS. Other Findings:             Anesthesia Plan      MAC     ASA 3                             PAT Chart Review:  Chart reviewed per routine by Dayana Agosto MD.  Above represents information available via shared medical record including previous anesthesia history, drug and allergy history.   Confirmation of above and final plan per Day of Surgery (DOS) anesthesiologist.    Dayana Agosto MD 12/7/2021

## 2021-12-08 ENCOUNTER — HOSPITAL ENCOUNTER (OUTPATIENT)
Age: 86
Setting detail: OUTPATIENT SURGERY
Discharge: HOME | End: 2021-12-08
Attending: OPHTHALMOLOGY | Admitting: OPHTHALMOLOGY
Payer: MEDICARE

## 2021-12-08 ENCOUNTER — ANESTHESIA (OUTPATIENT)
Dept: OPERATING ROOM | Age: 86
End: 2021-12-08
Payer: MEDICARE

## 2021-12-08 VITALS
DIASTOLIC BLOOD PRESSURE: 64 MMHG | SYSTOLIC BLOOD PRESSURE: 153 MMHG | BODY MASS INDEX: 22.73 KG/M2 | RESPIRATION RATE: 16 BRPM | HEART RATE: 65 BPM | HEIGHT: 68 IN | OXYGEN SATURATION: 94 % | WEIGHT: 150 LBS | TEMPERATURE: 97.6 F

## 2021-12-08 VITALS
RESPIRATION RATE: 10 BRPM | OXYGEN SATURATION: 95 % | SYSTOLIC BLOOD PRESSURE: 112 MMHG | DIASTOLIC BLOOD PRESSURE: 65 MMHG

## 2021-12-08 PROCEDURE — 6370000000 HC RX 637 (ALT 250 FOR IP): Performed by: OPHTHALMOLOGY

## 2021-12-08 PROCEDURE — 2580000003 HC RX 258: Performed by: ANESTHESIOLOGY

## 2021-12-08 PROCEDURE — 7100000010 HC PHASE II RECOVERY - FIRST 15 MIN: Performed by: OPHTHALMOLOGY

## 2021-12-08 PROCEDURE — 3700000000 HC ANESTHESIA ATTENDED CARE: Performed by: OPHTHALMOLOGY

## 2021-12-08 PROCEDURE — 2709999900 HC NON-CHARGEABLE SUPPLY: Performed by: OPHTHALMOLOGY

## 2021-12-08 PROCEDURE — 2500000003 HC RX 250 WO HCPCS: Performed by: OPHTHALMOLOGY

## 2021-12-08 PROCEDURE — V2632 POST CHMBR INTRAOCULAR LENS: HCPCS | Performed by: OPHTHALMOLOGY

## 2021-12-08 PROCEDURE — 6360000002 HC RX W HCPCS: Performed by: OPHTHALMOLOGY

## 2021-12-08 PROCEDURE — 3600000015 HC SURGERY LEVEL 5 ADDTL 15MIN: Performed by: OPHTHALMOLOGY

## 2021-12-08 PROCEDURE — 6360000002 HC RX W HCPCS: Performed by: NURSE ANESTHETIST, CERTIFIED REGISTERED

## 2021-12-08 PROCEDURE — 2720000010 HC SURG SUPPLY STERILE: Performed by: OPHTHALMOLOGY

## 2021-12-08 PROCEDURE — 3600000005 HC SURGERY LEVEL 5 BASE: Performed by: OPHTHALMOLOGY

## 2021-12-08 PROCEDURE — 3700000001 HC ADD 15 MINUTES (ANESTHESIA): Performed by: OPHTHALMOLOGY

## 2021-12-08 PROCEDURE — 7100000011 HC PHASE II RECOVERY - ADDTL 15 MIN: Performed by: OPHTHALMOLOGY

## 2021-12-08 DEVICE — LENS IO +210 DIOPT L13MM DIA6MM 0DEG HAPTIC ANG A CONSTANT: Type: IMPLANTABLE DEVICE | Site: EYE | Status: FUNCTIONAL

## 2021-12-08 RX ORDER — FENTANYL CITRATE 50 UG/ML
INJECTION, SOLUTION INTRAMUSCULAR; INTRAVENOUS PRN
Status: DISCONTINUED | OUTPATIENT
Start: 2021-12-08 | End: 2021-12-08 | Stop reason: SDUPTHER

## 2021-12-08 RX ORDER — BALANCED SALT SOLUTION 6.4; .75; .48; .3; 3.9; 1.7 MG/ML; MG/ML; MG/ML; MG/ML; MG/ML; MG/ML
SOLUTION OPHTHALMIC PRN
Status: DISCONTINUED | OUTPATIENT
Start: 2021-12-08 | End: 2021-12-08 | Stop reason: ALTCHOICE

## 2021-12-08 RX ORDER — MOXIFLOXACIN 5 MG/ML
SOLUTION/ DROPS OPHTHALMIC PRN
Status: DISCONTINUED | OUTPATIENT
Start: 2021-12-08 | End: 2021-12-08 | Stop reason: ALTCHOICE

## 2021-12-08 RX ORDER — PROPARACAINE HYDROCHLORIDE 5 MG/ML
1 SOLUTION/ DROPS OPHTHALMIC SEE ADMIN INSTRUCTIONS
Status: DISCONTINUED | OUTPATIENT
Start: 2021-12-08 | End: 2021-12-08 | Stop reason: HOSPADM

## 2021-12-08 RX ORDER — KETOROLAC TROMETHAMINE 5 MG/ML
1 SOLUTION OPHTHALMIC SEE ADMIN INSTRUCTIONS
Status: DISCONTINUED | OUTPATIENT
Start: 2021-12-08 | End: 2021-12-08 | Stop reason: HOSPADM

## 2021-12-08 RX ORDER — SODIUM CHLORIDE 0.9 % (FLUSH) 0.9 %
10 SYRINGE (ML) INJECTION PRN
Status: DISCONTINUED | OUTPATIENT
Start: 2021-12-08 | End: 2021-12-08 | Stop reason: HOSPADM

## 2021-12-08 RX ORDER — PHENYLEPHRINE HYDROCHLORIDE 100 MG/ML
1 SOLUTION/ DROPS OPHTHALMIC SEE ADMIN INSTRUCTIONS
Status: DISCONTINUED | OUTPATIENT
Start: 2021-12-08 | End: 2021-12-08 | Stop reason: HOSPADM

## 2021-12-08 RX ORDER — TETRACAINE HYDROCHLORIDE 5 MG/ML
SOLUTION OPHTHALMIC PRN
Status: DISCONTINUED | OUTPATIENT
Start: 2021-12-08 | End: 2021-12-08 | Stop reason: ALTCHOICE

## 2021-12-08 RX ORDER — TROPICAMIDE 10 MG/ML
1 SOLUTION/ DROPS OPHTHALMIC SEE ADMIN INSTRUCTIONS
Status: DISCONTINUED | OUTPATIENT
Start: 2021-12-08 | End: 2021-12-08 | Stop reason: HOSPADM

## 2021-12-08 RX ORDER — SODIUM CHLORIDE 9 MG/ML
25 INJECTION, SOLUTION INTRAVENOUS PRN
Status: DISCONTINUED | OUTPATIENT
Start: 2021-12-08 | End: 2021-12-08 | Stop reason: HOSPADM

## 2021-12-08 RX ORDER — SODIUM CHLORIDE 0.9 % (FLUSH) 0.9 %
10 SYRINGE (ML) INJECTION EVERY 12 HOURS SCHEDULED
Status: DISCONTINUED | OUTPATIENT
Start: 2021-12-08 | End: 2021-12-08 | Stop reason: HOSPADM

## 2021-12-08 RX ORDER — SODIUM CHLORIDE, SODIUM LACTATE, POTASSIUM CHLORIDE, CALCIUM CHLORIDE 600; 310; 30; 20 MG/100ML; MG/100ML; MG/100ML; MG/100ML
INJECTION, SOLUTION INTRAVENOUS CONTINUOUS
Status: DISCONTINUED | OUTPATIENT
Start: 2021-12-08 | End: 2021-12-08 | Stop reason: HOSPADM

## 2021-12-08 RX ORDER — TRIAMCINOLONE ACETONIDE 40 MG/ML
INJECTION, SUSPENSION INTRA-ARTICULAR; INTRAMUSCULAR PRN
Status: DISCONTINUED | OUTPATIENT
Start: 2021-12-08 | End: 2021-12-08 | Stop reason: ALTCHOICE

## 2021-12-08 RX ADMIN — TROPICAMIDE 1 DROP: 10 SOLUTION/ DROPS OPHTHALMIC at 09:35

## 2021-12-08 RX ADMIN — SODIUM CHLORIDE, POTASSIUM CHLORIDE, SODIUM LACTATE AND CALCIUM CHLORIDE: 600; 310; 30; 20 INJECTION, SOLUTION INTRAVENOUS at 09:53

## 2021-12-08 RX ADMIN — FENTANYL CITRATE 50 MCG: 50 INJECTION, SOLUTION INTRAMUSCULAR; INTRAVENOUS at 10:50

## 2021-12-08 RX ADMIN — PHENYLEPHRINE HYDROCHLORIDE 1 DROP: 100 SOLUTION/ DROPS OPHTHALMIC at 09:35

## 2021-12-08 RX ADMIN — FENTANYL CITRATE 50 MCG: 50 INJECTION, SOLUTION INTRAMUSCULAR; INTRAVENOUS at 10:45

## 2021-12-08 RX ADMIN — KETOROLAC TROMETHAMINE 1 DROP: 5 SOLUTION/ DROPS OPHTHALMIC at 09:35

## 2021-12-08 RX ADMIN — PROPARACAINE HYDROCHLORIDE 1 DROP: 5 SOLUTION/ DROPS OPHTHALMIC at 09:35

## 2021-12-08 ASSESSMENT — PAIN - FUNCTIONAL ASSESSMENT: PAIN_FUNCTIONAL_ASSESSMENT: 0-10

## 2021-12-08 ASSESSMENT — PAIN SCALES - GENERAL: PAINLEVEL_OUTOF10: 0

## 2021-12-08 ASSESSMENT — LIFESTYLE VARIABLES: SMOKING_STATUS: 0

## 2021-12-08 NOTE — H&P
Update History & Physical    The patient's History and Physical was reviewed with the patient and there were no significant changes. I examined the patient and there were no significant changes from the previous History and Physical.    Plan: The risk, benefits, expected outcome, and alternative to the recommended procedure have been discussed with the patient. Patient understands and wants to proceed with the procedure.     Electronically signed by Otilia Hernandez DO on 12/8/21 at 10:42 AM EST

## 2021-12-08 NOTE — OP NOTE
Ophthalmology Operative Note          Patient:  Christal Arnold  :   1932  Age/Sex:  80 y.o. male  Attending:  Mony Sol DO  Mrn:   [id number]  Acct:   [de-identified]       Adm:  2021  9:07 AM  Facility: [facility]:  Phacoemulsification with PCIOL implant left eye    Date of Procedure: 2021      PREOPERATIVE DIAGNOSIS: Visually significant cataract left eye; Astigmatism    POSTOPERATIVE DIAGNOSIS: Visually significant cataract left eye; Astigmatism    Primary Surgeon: Jeannette Pacheco    Consent Obtained: Obtained    Time Out Performed: Yes    ANESTHESIA: Mac Topical    ESTIMATED BLOOD LOSS:  Minimal    COMPLICATIONS:  None        DESCRIPTION OF PROCEDURE:      On the above-mentioned date, the patient was taken to the operative suite and prepared for ocular surgery. After the induction of sedation by the Department of Anesthesia, the operative eye was prepped and draped in usual sterile manner for ocular surgery. A lid speculum was placed to retract the upper and lower lids from each other. A paracentesis incision was made at the 12 o'clock position. Viscoelastic was then injected into the anterior chamber. A clear corneal incision was made at the 3 o'clock position, forming a corneal valve. A capsulorhexis was then performed with the [employer] forceps. BSS solution on a cannula was then used to hydrodissect the cortex from the lens capsule. Following this, the nucleus was rotated within the lens capsule. The phacoemulsifier was then introduced into the anterior chamber through the corneal valve incision and engaged the nucleus. Phacoemulsification was then carried out, dividing the nucleus into quadrants. The phacoemulsifier was then placed on burst mode and each of the quadrants was individually phacoemulsified and removed.   Once all quadrants had been removed, the phacoemulsification needle was removed and exchanged for an irrigation/aspirating (I/A) hand piece. This was employed to remove the cortical material remaining in the capsule. Once this was completed, the capsule was refilled with viscoelastic material.  The preselected intraocular lens was then placed in a lens injecting cartridge and passed through the corneal valve incision into the anterior chamber and thence into the capsular bag. It was then injected into the capsular bag and rotated into position with a Kuglen hook. It centered nicely, without difficulty. Once this was completed, the viscoelastic material was removed with the I/A device. The anterior chamber was reconstituted with BSS solution. The corneal valve was hydrated and the wound was checked for leaks. None were found, so it was elected to leave the wound sutureless. 0.1 cc of Vigamox was injected intracamerally and 4 mg of Kenalog was injected sub tenons. The drapes were removed and 2 drops of Vigamox solution were dropped into the eye. A clear plastic shield was placed over the eye and the patient was removed to the recovery area, having tolerated the procedure well with no complications.       Electronically signed by Sonal Cornell DO on 12/8/2021 at 11:04 AM

## 2021-12-08 NOTE — ANESTHESIA POSTPROCEDURE EVALUATION
Department of Anesthesiology  Postprocedure Note    Patient: Kayla Bermeo  MRN: [id number]  YOB: 1932  Date of evaluation: 12/8/2021  Time:  12:17 PM     Procedure Summary     Date: 12/08/21 Room / Location: [address] [place] [address] / [address] [place] [address]    Anesthesia Start: 5700 Anesthesia Stop: 1109    Procedure: CATARACT EXTRACTION WITH A INTRAOCULAR LENS IMPLANT OF LEFT EYE (Left ) Diagnosis: (NUCLEAR SCLEROTIC  CATARACT LEFT EYE)    Surgeons: Zechariah Domingo DO Responsible Provider: Jennifer Herman MD    Anesthesia Type: MAC ASA Status: 3          Anesthesia Type: MAC    Ruddy Phase I: Ruddy Score: 10    Ruddy Phase II: Ruddy Score: 10    Last vitals: Reviewed and per EMR flowsheets.        Anesthesia Post Evaluation    Patient location during evaluation: PACU  Patient participation: complete - patient participated  Level of consciousness: awake  Pain score: 0  Airway patency: patent  Nausea & Vomiting: no nausea  Complications: no  Cardiovascular status: blood pressure returned to baseline  Respiratory status: acceptable  Hydration status: euvolemic

## 2021-12-08 NOTE — ANESTHESIA PRE PROCEDURE
(MYDRIACYL) 1 % ophthalmic solution 1 drop  1 drop Left Eye See Admin Instructions Debora Blanca DO           Allergies: Allergies   Allergen Reactions    Other       dustmite mold some gum chewing causes sore       Problem List:    Patient Active Problem List   Diagnosis Code    Shoulder pain M25.519    Rotator cuff tear M75.100    Shoulder impingement M75.40    Symptomatic bradycardia R00.1    Thrombocytopenia (HCC) D69.6    Labyrinthitis H83.09    Right cataract H26.9    GI bleed K92.2    Epigastric abdominal pain R10.13    First degree AV block I44.0    Acute blood loss anemia D62       Past Medical History:        Diagnosis Date    Arthritis of neck     Asthma     Borderline for asthma    BPH (benign prostatic hyperplasia)     Bradycardia 01/08/2013    resolved; Dr. Marilin Singleton last visit 1-21    Bronchitis     Cholelithiasis     History of exercise stress test 04/13/2015    nuclear    History of stress test 01/10/2013    Labyrinthitis     with vertiginous symptomatology    Rotator cuff tear     Right    S/P transesophageal echocardiogram (ANN) 01/09/2013    Thrombocytopenia ([address]     MILD       Past Surgical History:        Procedure Laterality Date    CARDIOVASCULAR STRESS TEST  1/8/13    sees dr Marilin Singleton yearly was seen due to vertigo    CATARACT REMOVAL WITH IMPLANT Right 02/03/15    DOPPLER ECHOCARDIOGRAPHY  1/10/12    NASAL SEPTUM SURGERY      PRE-MALIGNANT / BENIGN SKIN LESION EXCISION      TONSILLECTOMY      TONSILLECTOMY AND ADENOIDECTOMY         Social History:    Social History     Tobacco Use    Smoking status: Never Smoker    Smokeless tobacco: Never Used   Substance Use Topics    Alcohol use: Yes     Comment: 1 beer per week                                Counseling given: Not Answered      Vital Signs (Current): There were no vitals filed for this visit.                                            BP Readings from Last 3 Encounters:   12/08/21 (!) 144/80 01/19/21 136/70   06/24/20 125/76       NPO Status:                                                                                 BMI:   Wt Readings from Last 3 Encounters:   12/08/21 150 lb (68 kg)   01/19/21 153 lb (69.4 kg)   06/23/20 152 lb 3.2 oz (69 kg)     There is no height or weight on file to calculate BMI.    CBC:   Lab Results   Component Value Date    WBC 4.3 06/24/2020    RBC 3.87 06/24/2020    HGB 11.3 06/24/2020    HCT 34.1 06/24/2020    MCV 88.1 06/24/2020    RDW 13.3 06/24/2020     06/24/2020       CMP:   Lab Results   Component Value Date     06/22/2020    K 3.9 06/22/2020     06/22/2020    CO2 28 06/22/2020    BUN 20 06/22/2020    CREATININE 0.9 06/22/2020    GFRAA >60 06/22/2020    LABGLOM >60 06/22/2020    GLUCOSE 83 06/22/2020    GLUCOSE 80 09/23/2011    PROT 7.0 08/09/2019    CALCIUM 8.7 06/22/2020    BILITOT 0.5 08/09/2019    ALKPHOS 79 08/09/2019    AST 19 08/09/2019    ALT 14 08/09/2019       POC Tests: No results for input(s): POCGLU, POCNA, POCK, POCCL, POCBUN, POCHEMO, POCHCT in the last 72 hours.     Coags:   Lab Results   Component Value Date    PROTIME 13.9 06/22/2020    INR 1.2 06/22/2020    APTT 28.2 06/22/2020       HCG (If Applicable): No results found for: PREGTESTUR, PREGSERUM, HCG, HCGQUANT     ABGs: No results found for: PHART, PO2ART, YHO6VHI, XYH0FDK, BEART, N3MWGSOL     Type & Screen (If Applicable):  No results found for: LABABO, LABRH    Drug/Infectious Status (If Applicable):  No results found for: HIV, HEPCAB    COVID-19 Screening (If Applicable): No results found for: COVID19        Anesthesia Evaluation  Patient summary reviewed no history of anesthetic complications:   Airway: Mallampati: I  TM distance: >3 FB     Mouth opening: > = 3 FB Dental:          Pulmonary: breath sounds clear to auscultation  (+) asthma:     (-) not a current smoker                           Cardiovascular:          ECG reviewed  Rhythm: regular  Rate: normal ROS comment: First degree block on EKG     Neuro/Psych:   Negative Neuro/Psych ROS              GI/Hepatic/Renal:            ROS comment: BPH (benign prostatic hyperplasia). Endo/Other:    (+) : arthritis: OA., .                 Abdominal:             Vascular: negative vascular ROS. Other Findings:               Anesthesia Plan      MAC     ASA 3       Induction: intravenous. Anesthetic plan and risks discussed with patient. Plan discussed with CRNA. Attending anesthesiologist reviewed and agrees with Preprocedure content          PAT Chart Review:  Chart reviewed per routine by Tal Garcia MD.  Above represents information available via shared medical record including previous anesthesia history, drug and allergy history.   Confirmation of above and final plan per Day of Surgery (DOS) anesthesiologist.    Margaret Montes De Oca MD   12/8/2021

## 2022-04-04 NOTE — PROGRESS NOTES
Cardiology Progress Note  Dr. Elier Killian      Referring Physician: No referring provider defined for this encounter. CHIEF COMPLAINT:   Chief Complaint   Patient presents with    Hypertension     Annual, patient has no complaints       History Obtained From: patient  HISTORY OF PRESENT ILLNESS:   Patient is 80year old male with history of PACs, presented today for annual follow-up. Occasional pedal edema, patient denies any chest pain, no shortness of breath, no lightheadedness, no dizziness, no palpitations, no PND, no orthopnea, no syncope, no presyncopal episodes.   Functional capacity is at baseline      Past Medical History:   Diagnosis Date    Arthritis of neck     Asthma     Borderline for asthma    BPH (benign prostatic hyperplasia)     Bradycardia 01/08/2013    resolved; Dr. Laith Albarran last visit 1-21    Bronchitis     Cholelithiasis     History of exercise stress test 04/13/2015    nuclear    History of stress test 01/10/2013    Labyrinthitis     with vertiginous symptomatology    Rotator cuff tear     Right    S/P transesophageal echocardiogram (ANN) 01/09/2013    Thrombocytopenia ([address]     MILD         Past Surgical History:   Procedure Laterality Date    CARDIOVASCULAR STRESS TEST  1/8/13    sees dr Laith Albarran yearly was seen due to vertigo    CATARACT REMOVAL Left 12/8/2021    CATARACT EXTRACTION WITH A INTRAOCULAR LENS IMPLANT OF LEFT EYE performed by Rosario Redmond DO at [address] Right 02/03/15    DOPPLER ECHOCARDIOGRAPHY  1/10/12    NASAL SEPTUM SURGERY      PRE-MALIGNANT / BENIGN SKIN LESION EXCISION      TONSILLECTOMY      TONSILLECTOMY AND ADENOIDECTOMY           Current Outpatient Medications   Medication Sig Dispense Refill    FLOVENT DISKUS 100 MCG/BLIST AEPB inhaler       albuterol sulfate  (90 BASE) MCG/ACT inhaler Inhale 2 puffs into the lungs every 6 hours as needed for Wheezing      fluticasone (FLONASE) 50 MCG/ACT nasal spray 1 spray by Nasal route as needed.  Naproxen Sodium (ALEVE) 220 MG CAPS Take by mouth as needed for Pain (Patient not taking: Reported on 4/5/2022)       No current facility-administered medications for this visit. Allergies as of 04/05/2022 - Fully Reviewed 04/05/2022   Allergen Reaction Noted    Other  01/30/2015       Social History     Socioeconomic History    Marital status:      Spouse name: Not on file    Number of children: Not on file    Years of education: Not on file    Highest education level: Not on file   Occupational History    Not on file   Tobacco Use    Smoking status: Never Smoker    Smokeless tobacco: Never Used   Vaping Use    Vaping Use: Never used   Substance and Sexual Activity    Alcohol use: Yes     Comment: 1 beer per week    Drug use: No    Sexual activity: Not Currently     Partners: Female   Other Topics Concern    Not on file   Social History Narrative    Not on file     Social Determinants of Health     Financial Resource Strain:     Difficulty of Paying Living Expenses: Not on file   Food Insecurity:     Worried About Running Out of Food in the Last Year: Not on file    Jesús of Food in the Last Year: Not on file   Transportation Needs:     Lack of Transportation (Medical): Not on file    Lack of Transportation (Non-Medical):  Not on file   Physical Activity:     Days of Exercise per Week: Not on file    Minutes of Exercise per Session: Not on file   Stress:     Feeling of Stress : Not on file   Social Connections:     Frequency of Communication with Friends and Family: Not on file    Frequency of Social Gatherings with Friends and Family: Not on file    Attends Temple Services: Not on file    Active Member of Clubs or Organizations: Not on file    Attends Club or Organization Meetings: Not on file    Marital Status: Not on file   Intimate Partner Violence:     Fear of Current or Ex-Partner: Not on file   Ariel Emotionally Abused: Not on file    Physically Abused: Not on file    Sexually Abused: Not on file   Housing Stability:     Unable to Pay for Housing in the Last Year: Not on file    Number of Places Lived in the Last Year: Not on file    Unstable Housing in the Last Year: Not on file       Family History   Problem Relation Age of Onset    No Known Problems Mother     No Known Problems Father        REVIEW OF SYSTEMS:     CONSTITUTIONAL:  negative for  fevers, chills, sweats and fatigue  HEENT:  negative for  tinnitus, earaches, nasal congestion and epistaxis  RESPIRATORY:  negative for  dry cough, cough with sputum, dyspnea, wheezing and hemoptysis  GASTROINTESTINAL:  negative for nausea, vomiting, diarrhea, constipation, pruritus and jaundice  HEMATOLOGIC/LYMPHATIC:  negative for easy bruising, bleeding, lymphadenopathy and petechiae  ENDOCRINE:  negative for heat intolerance, cold intolerance, tremor, hair loss and diabetic symptoms including neither polyuria nor polydipsia nor blurred vision  MUSCULOSKELETAL:  negative for  myalgias, arthralgias, joint swelling, stiff joints and decreased range of motion  NEUROLOGICAL:  negative for memory problems, speech problems, visual disturbance, dysphagia, weakness and numbness      PHYSICAL EXAM:   Constitutional:  Awake, alert cooperative, no apparent distress, and appears stated age. HEENT:  Moist and pink mucous membranes, normocephalic, without obvious abnormality, atraumatic, normal ears and nose.   Neck:  Supple, symmetrical, trachea midline, no JVD, no adenopathy, thyroid symmetric, not enlarged and no tenderness, good carotid upstroke bilaterally, no carotid bruit, skin normal  Lungs: No increased work of breathing, decreased air exchange, bilateral wheezing  Cardiovascular: Normal apical impulse, regular rate and rhythm with extra beat, normal S1 and S2, no S3 or S4, no murmur, no pedal edema, good carotid upstroke bilaterally, no carotid bruit, no JVD, no abdominal pulsating masses. Abdomen: Soft, nontender, no hepatomegaly, no splenomegaly, bowel sound positive. CHEST:  Expands symmetrically, nontender to palpation. Musculoskeletal:  No clubbing or cyanosis. No redness, warmth, or swelling of the joints. Neurological: Alert, awake, and oriented X3. ]  /74   Pulse 61   Resp 16   Ht 5' 8\" (1.727 m)   Wt 150 lb (68 kg)   BMI 22.81 kg/m²     DATA: I have personally reviewed the EKG with the following interpretation: Sinus rhythm, occasional PACs, possible old septal wall MI age undetermined. EKG 1/19/21 Sinus rhythm, left anterior fascicular block, old septal wall MI age undetermined. ECHO: 1/10/13 Summary     Left Ventricle    Measured ejection fraction is 70 %. Mitral Valve    Physiologic and/or trace mitral regurgitation is present. Conclusions       Summary    echocardiogram relatively unremarkable. Stress Test: 4/13/15  IMPRESSION:    No scintigraphic evidence of exercise induced left   ventricular myocardial ischemia.       Findings: Left ventricular myocardial contractibility was   evaluated as part of SPECT cardiac imaging. Left ventricular   chamber size is normal. Myocardial motion is unremarkable.       IMPRESSION: Unremarkable study.       Findings: SPECT gated images of the left ventricular myocardium   were obtained for purposes of left ventricular ejection fraction   determination.  Left ventricular ejection fraction is calculated   at 67%.                Angiography: Not performed to date  Cardiology Labs:   BMP:    Lab Results   Component Value Date     06/22/2020    K 3.9 06/22/2020     06/22/2020    CO2 28 06/22/2020    BUN 20 06/22/2020     CMP:    Lab Results   Component Value Date     06/22/2020    K 3.9 06/22/2020     06/22/2020    CO2 28 06/22/2020    BUN 20 06/22/2020    PROT 7.0 08/09/2019     CBC:    Lab Results   Component Value Date    WBC 4.3 06/24/2020    RBC 3.87 06/24/2020    HGB 11.3 06/24/2020    HCT 34.1 06/24/2020    MCV 88.1 06/24/2020    RDW 13.3 06/24/2020     06/24/2020     PT/INR:  No results found for: PTINR  PT/INR Warfarin:  No components found for: PTPATWAR, PTINRWAR  PTT:    Lab Results   Component Value Date    APTT 28.2 06/22/2020     PTT Heparin:  No components found for: APTTHEP  Magnesium:    Lab Results   Component Value Date    MG 1.9 01/10/2013     TSH:    Lab Results   Component Value Date    TSH 1.730 08/09/2019     TROPONIN:  No components found for: TROP  BNP:  No results found for: BNP  FASTING LIPID PANEL:    Lab Results   Component Value Date    CHOL 194 12/07/2016    HDL 66 07/06/2018    TRIG 118 12/07/2016     No orders to display     I have personally reviewed the laboratory, cardiac diagnostic and radiographic testing as outlined above:      IMPRESSION:   1. Atrial premature depolarization Chronic, asymptomatic, continue current treatment                        2.  Hypertension: Controlled                           3.  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 Continue current treatment  2. Increase physical activities, low-salt low-cholesterol diet will advise  3. Follow-up with Dr. Ernst Alvarez as scheduled  4. Follow-up with Dr. Lenin Gomez in 1 year sooner if symptomatic for any reason    I have reviewed my findings and recommendations with patient    Electronically signed by Celina Griggs MD on 4/5/2022 at 9:10 AM  NOTE: This report was transcribed using voice recognition software.  Every effort was made to ensure accuracy; however, inadvertent computerized transcription errors may be present

## 2022-04-05 ENCOUNTER — OFFICE VISIT (OUTPATIENT)
Dept: CARDIOLOGY CLINIC | Age: 87
End: 2022-04-05
Payer: MEDICARE

## 2022-04-05 VITALS
SYSTOLIC BLOOD PRESSURE: 128 MMHG | HEIGHT: 68 IN | WEIGHT: 150 LBS | BODY MASS INDEX: 22.73 KG/M2 | HEART RATE: 61 BPM | RESPIRATION RATE: 16 BRPM | DIASTOLIC BLOOD PRESSURE: 74 MMHG

## 2022-04-05 DIAGNOSIS — I10 ESSENTIAL HYPERTENSION: Primary | ICD-10-CM

## 2022-04-05 PROCEDURE — 99213 OFFICE O/P EST LOW 20 MIN: CPT | Performed by: INTERNAL MEDICINE

## 2022-04-05 PROCEDURE — 93000 ELECTROCARDIOGRAM COMPLETE: CPT | Performed by: INTERNAL MEDICINE

## 2022-04-05 PROCEDURE — 1036F TOBACCO NON-USER: CPT | Performed by: INTERNAL MEDICINE

## 2022-04-05 PROCEDURE — 1123F ACP DISCUSS/DSCN MKR DOCD: CPT | Performed by: INTERNAL MEDICINE

## 2022-04-05 PROCEDURE — G8427 DOCREV CUR MEDS BY ELIG CLIN: HCPCS | Performed by: INTERNAL MEDICINE

## 2022-04-05 PROCEDURE — G8420 CALC BMI NORM PARAMETERS: HCPCS | Performed by: INTERNAL MEDICINE

## 2022-04-05 PROCEDURE — 4040F PNEUMOC VAC/ADMIN/RCVD: CPT | Performed by: INTERNAL MEDICINE

## 2023-05-04 ENCOUNTER — OFFICE VISIT (OUTPATIENT)
Dept: CARDIOLOGY CLINIC | Age: 88
End: 2023-05-04
Payer: MEDICARE

## 2023-05-04 VITALS
RESPIRATION RATE: 16 BRPM | HEIGHT: 68 IN | WEIGHT: 145.8 LBS | BODY MASS INDEX: 22.1 KG/M2 | SYSTOLIC BLOOD PRESSURE: 132 MMHG | DIASTOLIC BLOOD PRESSURE: 84 MMHG | HEART RATE: 62 BPM

## 2023-05-04 DIAGNOSIS — I10 ESSENTIAL HYPERTENSION: Primary | ICD-10-CM

## 2023-05-04 PROCEDURE — 1123F ACP DISCUSS/DSCN MKR DOCD: CPT | Performed by: INTERNAL MEDICINE

## 2023-05-04 PROCEDURE — G8420 CALC BMI NORM PARAMETERS: HCPCS | Performed by: INTERNAL MEDICINE

## 2023-05-04 PROCEDURE — 93000 ELECTROCARDIOGRAM COMPLETE: CPT | Performed by: INTERNAL MEDICINE

## 2023-05-04 PROCEDURE — 99213 OFFICE O/P EST LOW 20 MIN: CPT | Performed by: INTERNAL MEDICINE

## 2023-05-04 PROCEDURE — 1036F TOBACCO NON-USER: CPT | Performed by: INTERNAL MEDICINE

## 2023-05-04 PROCEDURE — G8427 DOCREV CUR MEDS BY ELIG CLIN: HCPCS | Performed by: INTERNAL MEDICINE

## 2023-05-04 NOTE — PROGRESS NOTES
Cardiology Progress Note  Dr. Yosi Ayala      Referring Physician: No referring provider defined for this encounter. CHIEF COMPLAINT:   Chief Complaint   Patient presents with    Heart Problem     Annual        History Obtained From: patient  HISTORY OF PRESENT ILLNESS:   Patient is 80year old male with history of PACs, presented today for annual follow-up. patient denies any chest pain, no shortness of breath, no lightheadedness, no dizziness, no palpitations, no PND, no orthopnea, no syncope, no presyncopal episodes.   Functional capacity is at baseline    Past Medical History:   Diagnosis Date    Arthritis of neck     Asthma     Borderline for asthma    BPH (benign prostatic hyperplasia)     Bradycardia 01/08/2013    resolved; Dr. Brook Moore last visit 1-21    Bronchitis     Cholelithiasis     History of exercise stress test 04/13/2015    nuclear    History of stress test 01/10/2013    Labyrinthitis     with vertiginous symptomatology    Rotator cuff tear     Right    S/P transesophageal echocardiogram (ANN) 01/09/2013    Thrombocytopenia ([address]     MILD         Past Surgical History:   Procedure Laterality Date    CARDIOVASCULAR STRESS TEST  1/8/13    sees dr Brook Moore yearly was seen due to vertigo    CATARACT REMOVAL Left 12/8/2021    CATARACT EXTRACTION WITH A INTRAOCULAR LENS IMPLANT OF LEFT EYE performed by Geneva Perkins DO at [address] Right 02/03/15    DOPPLER ECHOCARDIOGRAPHY  1/10/12    NASAL SEPTUM SURGERY      PRE-MALIGNANT / BENIGN SKIN LESION EXCISION      TONSILLECTOMY      TONSILLECTOMY AND ADENOIDECTOMY           Current Outpatient Medications   Medication Sig Dispense Refill    FLOVENT DISKUS 100 MCG/BLIST AEPB inhaler       albuterol sulfate  (90 BASE) MCG/ACT inhaler Inhale 2 puffs into the lungs every 6 hours as needed for Wheezing      fluticasone (FLONASE) 50 MCG/ACT nasal spray 1 spray by Nasal route as needed      Naproxen Sodium

## 2023-11-21 ENCOUNTER — HOSPITAL ENCOUNTER (EMERGENCY)
Age: 88
Discharge: HOME | End: 2023-11-21
Attending: EMERGENCY MEDICINE
Payer: MEDICARE

## 2023-11-21 ENCOUNTER — APPOINTMENT (OUTPATIENT)
Dept: CT IMAGING | Age: 88
End: 2023-11-21
Payer: MEDICARE

## 2023-11-21 ENCOUNTER — APPOINTMENT (OUTPATIENT)
Dept: ULTRASOUND IMAGING | Age: 88
End: 2023-11-21
Payer: MEDICARE

## 2023-11-21 ENCOUNTER — APPOINTMENT (OUTPATIENT)
Dept: GENERAL RADIOLOGY | Age: 88
End: 2023-11-21
Payer: MEDICARE

## 2023-11-21 VITALS
HEART RATE: 84 BPM | WEIGHT: 139 LBS | BODY MASS INDEX: 21.13 KG/M2 | OXYGEN SATURATION: 92 % | DIASTOLIC BLOOD PRESSURE: 55 MMHG | RESPIRATION RATE: 16 BRPM | TEMPERATURE: 98.6 F | SYSTOLIC BLOOD PRESSURE: 109 MMHG

## 2023-11-21 DIAGNOSIS — J18.9 PNEUMONIA OF BOTH LUNGS DUE TO INFECTIOUS ORGANISM, UNSPECIFIED PART OF LUNG: Primary | ICD-10-CM

## 2023-11-21 LAB
ALBUMIN SERPL-MCNC: 3.3 G/DL (ref 3.5–5.2)
ALP SERPL-CCNC: 71 U/L (ref 40–129)
ALT SERPL-CCNC: 11 U/L (ref 0–40)
ANION GAP SERPL CALCULATED.3IONS-SCNC: 10 MMOL/L (ref 7–16)
AST SERPL-CCNC: 17 U/L (ref 0–39)
BASOPHILS # BLD: 0.04 K/UL (ref 0–0.2)
BASOPHILS NFR BLD: 0 % (ref 0–2)
BILIRUB SERPL-MCNC: 0.9 MG/DL (ref 0–1.2)
BNP SERPL-MCNC: 767 PG/ML (ref 0–450)
BUN SERPL-MCNC: 18 MG/DL (ref 6–23)
CALCIUM SERPL-MCNC: 8.5 MG/DL (ref 8.6–10.2)
CHLORIDE SERPL-SCNC: 101 MMOL/L (ref 98–107)
CO2 SERPL-SCNC: 25 MMOL/L (ref 22–29)
CREAT SERPL-MCNC: 0.9 MG/DL (ref 0.7–1.2)
EKG ATRIAL RATE: 66 BPM
EKG P AXIS: 88 DEGREES
EKG P-R INTERVAL: 210 MS
EKG Q-T INTERVAL: 384 MS
EKG QRS DURATION: 92 MS
EKG QTC CALCULATION (BAZETT): 402 MS
EKG R AXIS: -57 DEGREES
EKG T AXIS: 82 DEGREES
EKG VENTRICULAR RATE: 66 BPM
EOSINOPHIL # BLD: 0.17 K/UL (ref 0.05–0.5)
EOSINOPHILS RELATIVE PERCENT: 2 % (ref 0–6)
ERYTHROCYTE [DISTWIDTH] IN BLOOD BY AUTOMATED COUNT: 13.2 % (ref 11.5–15)
GFR SERPL CREATININE-BSD FRML MDRD: >60 ML/MIN/1.73M2
GLUCOSE SERPL-MCNC: 149 MG/DL (ref 74–99)
HCT VFR BLD AUTO: 38.5 % (ref 37–54)
HGB BLD-MCNC: 12.9 G/DL (ref 12.5–16.5)
IMM GRANULOCYTES # BLD AUTO: 0.03 K/UL (ref 0–0.58)
IMM GRANULOCYTES NFR BLD: 0 % (ref 0–5)
INR PPP: 1.3
LIPASE SERPL-CCNC: 10 U/L (ref 13–60)
LYMPHOCYTES NFR BLD: 0.95 K/UL (ref 1.5–4)
LYMPHOCYTES RELATIVE PERCENT: 9 % (ref 20–42)
MCH RBC QN AUTO: 28.5 PG (ref 26–35)
MCHC RBC AUTO-ENTMCNC: 33.5 G/DL (ref 32–34.5)
MCV RBC AUTO: 85.2 FL (ref 80–99.9)
MONOCYTES NFR BLD: 0.51 K/UL (ref 0.1–0.95)
MONOCYTES NFR BLD: 5 % (ref 2–12)
NEUTROPHILS NFR BLD: 83 % (ref 43–80)
NEUTS SEG NFR BLD: 8.36 K/UL (ref 1.8–7.3)
PLATELET # BLD AUTO: 153 K/UL (ref 130–450)
PMV BLD AUTO: 10.6 FL (ref 7–12)
POTASSIUM SERPL-SCNC: 4 MMOL/L (ref 3.5–5)
PROT SERPL-MCNC: 5.9 G/DL (ref 6.4–8.3)
PROTHROMBIN TIME: 15 SEC (ref 9.3–12.4)
RBC # BLD AUTO: 4.52 M/UL (ref 3.8–5.8)
SODIUM SERPL-SCNC: 136 MMOL/L (ref 132–146)
TROPONIN I SERPL HS-MCNC: 54 NG/L (ref 0–11)
TROPONIN I SERPL HS-MCNC: 57 NG/L (ref 0–11)
WBC OTHER # BLD: 10.1 K/UL (ref 4.5–11.5)

## 2023-11-21 PROCEDURE — 93005 ELECTROCARDIOGRAM TRACING: CPT | Performed by: EMERGENCY MEDICINE

## 2023-11-21 PROCEDURE — 93010 ELECTROCARDIOGRAM REPORT: CPT | Performed by: INTERNAL MEDICINE

## 2023-11-21 PROCEDURE — 6360000004 HC RX CONTRAST MEDICATION: Performed by: RADIOLOGY

## 2023-11-21 PROCEDURE — 71046 X-RAY EXAM CHEST 2 VIEWS: CPT

## 2023-11-21 PROCEDURE — 84484 ASSAY OF TROPONIN QUANT: CPT

## 2023-11-21 PROCEDURE — 71275 CT ANGIOGRAPHY CHEST: CPT

## 2023-11-21 PROCEDURE — 99285 EMERGENCY DEPT VISIT HI MDM: CPT

## 2023-11-21 PROCEDURE — 6370000000 HC RX 637 (ALT 250 FOR IP): Performed by: EMERGENCY MEDICINE

## 2023-11-21 PROCEDURE — 83880 ASSAY OF NATRIURETIC PEPTIDE: CPT

## 2023-11-21 PROCEDURE — 85610 PROTHROMBIN TIME: CPT

## 2023-11-21 PROCEDURE — 93971 EXTREMITY STUDY: CPT

## 2023-11-21 PROCEDURE — 85025 COMPLETE CBC W/AUTO DIFF WBC: CPT

## 2023-11-21 PROCEDURE — 83690 ASSAY OF LIPASE: CPT

## 2023-11-21 PROCEDURE — 80053 COMPREHEN METABOLIC PANEL: CPT

## 2023-11-21 RX ORDER — AMOXICILLIN AND CLAVULANATE POTASSIUM 875; 125 MG/1; MG/1
1 TABLET, FILM COATED ORAL 2 TIMES DAILY
Qty: 14 TABLET | Refills: 0 | Status: SHIPPED | OUTPATIENT
Start: 2023-11-21 | End: 2023-11-28

## 2023-11-21 RX ORDER — DOXYCYCLINE HYCLATE 100 MG
100 TABLET ORAL 2 TIMES DAILY
Qty: 14 TABLET | Refills: 0 | Status: SHIPPED | OUTPATIENT
Start: 2023-11-21 | End: 2023-11-28

## 2023-11-21 RX ORDER — AMOXICILLIN AND CLAVULANATE POTASSIUM 875; 125 MG/1; MG/1
1 TABLET, FILM COATED ORAL ONCE
Status: COMPLETED | OUTPATIENT
Start: 2023-11-21 | End: 2023-11-21

## 2023-11-21 RX ORDER — DOXYCYCLINE HYCLATE 100 MG/1
100 CAPSULE ORAL ONCE
Status: COMPLETED | OUTPATIENT
Start: 2023-11-21 | End: 2023-11-21

## 2023-11-21 RX ADMIN — AMOXICILLIN AND CLAVULANATE POTASSIUM 1 TABLET: 875; 125 TABLET, FILM COATED ORAL at 20:13

## 2023-11-21 RX ADMIN — DOXYCYCLINE HYCLATE 100 MG: 100 CAPSULE ORAL at 20:13

## 2023-11-21 RX ADMIN — IOPAMIDOL 70 ML: 755 INJECTION, SOLUTION INTRAVENOUS at 18:15

## 2023-11-21 ASSESSMENT — LIFESTYLE VARIABLES
HOW MANY STANDARD DRINKS CONTAINING ALCOHOL DO YOU HAVE ON A TYPICAL DAY: PATIENT DOES NOT DRINK
HOW OFTEN DO YOU HAVE A DRINK CONTAINING ALCOHOL: NEVER

## 2023-11-21 NOTE — ED PROVIDER NOTES
[address] ENCOUNTER      Pt Name: Kalia Gomez  MRN: [id number]  [address] [facility] 2/11/1932  Date of evaluation: 11/21/2023  Provider: Hobson Duane, DO  PCP: Zurdo Weir MD  Note Started: 3:59 PM EST 11/21/23    CHIEF COMPLAINT       Chief Complaint   Patient presents with    Cough     Cough and congestion for about 6 weeks. Pt prescribed antibiotics and nasal spray by PCP. Pt states that he is having severe pain in the left lower leg. HISTORY OF PRESENT ILLNESS: 1 or more Elements   History From: Patient  Limitations to history : None    Kalia Gomez is a 80 y.o. male who presents to the [facility] for evaluation of a cough. Patient states for the past 6 weeks or so has been experiencing a cough as well as some shortness of breath which is worse when he lays flat. Additionally patient states been having some left lower leg pain as well. Patient states he been having low-grade fevers. Patient does have varicose vein and believes this is what is causing his pain. Patient has no other reported mitigating or exacerbating factors. Patient saw his doctor advised him to come to the ER for further evaluation and monitoring    Nursing Notes were all reviewed and agreed with or any disagreements were addressed in the HPI. REVIEW OF SYSTEMS :    Positives and Pertinent negatives as per HPI.      SURGICAL HISTORY     Past Surgical History:   Procedure Laterality Date    CARDIOVASCULAR STRESS TEST  1/8/13    sees dr Jonathan Vilchis yearly was seen due to vertigo    CATARACT REMOVAL Left 12/8/2021    CATARACT EXTRACTION WITH A INTRAOCULAR LENS IMPLANT OF LEFT EYE performed by Edith Vargas DO at [facility] Right 02/03/15    DOPPLER ECHOCARDIOGRAPHY  1/10/12    NASAL SEPTUM SURGERY      PRE-MALIGNANT / BENIGN SKIN LESION EXCISION      TONSILLECTOMY      TONSILLECTOMY AND ADENOIDECTOMY

## 2024-03-28 ENCOUNTER — OFFICE VISIT (OUTPATIENT)
Dept: CARDIOLOGY CLINIC | Age: 89
End: 2024-03-28
Payer: MEDICARE

## 2024-03-28 VITALS
HEIGHT: 68 IN | DIASTOLIC BLOOD PRESSURE: 78 MMHG | WEIGHT: 146 LBS | HEART RATE: 94 BPM | RESPIRATION RATE: 18 BRPM | SYSTOLIC BLOOD PRESSURE: 122 MMHG | OXYGEN SATURATION: 94 % | BODY MASS INDEX: 22.13 KG/M2

## 2024-03-28 DIAGNOSIS — I10 ESSENTIAL HYPERTENSION: Primary | ICD-10-CM

## 2024-03-28 PROCEDURE — 1036F TOBACCO NON-USER: CPT | Performed by: INTERNAL MEDICINE

## 2024-03-28 PROCEDURE — G8484 FLU IMMUNIZE NO ADMIN: HCPCS | Performed by: INTERNAL MEDICINE

## 2024-03-28 PROCEDURE — G8427 DOCREV CUR MEDS BY ELIG CLIN: HCPCS | Performed by: INTERNAL MEDICINE

## 2024-03-28 PROCEDURE — 99213 OFFICE O/P EST LOW 20 MIN: CPT | Performed by: INTERNAL MEDICINE

## 2024-03-28 PROCEDURE — 93000 ELECTROCARDIOGRAM COMPLETE: CPT | Performed by: INTERNAL MEDICINE

## 2024-03-28 PROCEDURE — G8420 CALC BMI NORM PARAMETERS: HCPCS | Performed by: INTERNAL MEDICINE

## 2024-03-28 PROCEDURE — 1123F ACP DISCUSS/DSCN MKR DOCD: CPT | Performed by: INTERNAL MEDICINE

## 2024-03-28 NOTE — PROGRESS NOTES
Cardiology Progress Note  Dr. Wilmer Bermeo      Referring Physician: No referring provider defined for this encounter.  CHIEF COMPLAINT:   Chief Complaint   Patient presents with    Annual Exam    Hypertension       History Obtained From: patient  HISTORY OF PRESENT ILLNESS:   Patient is 89 year old male with history of PACs, presented today for annual follow-up.    patient denies any chest pain, no shortness of breath, no lightheadedness, no dizziness, no palpitations, no PND, no orthopnea, no syncope, no presyncopal episodes.  Functional capacity is at baseline    Past Medical History:   Diagnosis Date    Arthritis of neck     Asthma     Borderline for asthma    BPH (benign prostatic hyperplasia)     Bradycardia 01/08/2013    resolved; Dr. bermeo last visit 1-21    Bronchitis     Cholelithiasis     History of exercise stress test 04/13/2015    nuclear    History of stress test 01/10/2013    Labyrinthitis     with vertiginous symptomatology    Rotator cuff tear     Right    S/P transesophageal echocardiogram (ANN) 01/09/2013    Thrombocytopenia (HCC)     MILD         Past Surgical History:   Procedure Laterality Date    CARDIOVASCULAR STRESS TEST  1/8/13    sees dr bermeo yearly was seen due to vertigo    CATARACT REMOVAL Left 12/8/2021    CATARACT EXTRACTION WITH A INTRAOCULAR LENS IMPLANT OF LEFT EYE performed by Jeovanny Lofton DO at [facility] OR    CATARACT REMOVAL WITH IMPLANT Right 02/03/15    DOPPLER ECHOCARDIOGRAPHY  1/10/12    NASAL SEPTUM SURGERY      PRE-MALIGNANT / BENIGN SKIN LESION EXCISION      TONSILLECTOMY      TONSILLECTOMY AND ADENOIDECTOMY           Current Outpatient Medications   Medication Sig Dispense Refill    FLOVENT DISKUS 100 MCG/BLIST AEPB inhaler       albuterol sulfate  (90 BASE) MCG/ACT inhaler Inhale 2 puffs into the lungs every 6 hours as needed for Wheezing      fluticasone (FLONASE) 50 MCG/ACT nasal spray 1 spray by Nasal route as needed      Naproxen Sodium

## 2024-07-11 LAB
ALBUMIN SERPL-MCNC: 3.8 G/DL (ref 3.5–5.2)
ALP SERPL-CCNC: 79 U/L (ref 40–129)
ALT SERPL-CCNC: 13 U/L (ref 0–40)
ANION GAP SERPL CALCULATED.3IONS-SCNC: 10 MMOL/L (ref 7–16)
AST SERPL-CCNC: 22 U/L (ref 0–39)
BILIRUB SERPL-MCNC: 0.6 MG/DL (ref 0–1.2)
BUN SERPL-MCNC: 16 MG/DL (ref 6–23)
CALCIUM SERPL-MCNC: 9 MG/DL (ref 8.6–10.2)
CHLORIDE SERPL-SCNC: 104 MMOL/L (ref 98–107)
CHOLEST SERPL-MCNC: 154 MG/DL
CO2 SERPL-SCNC: 28 MMOL/L (ref 22–29)
CREAT SERPL-MCNC: 0.9 MG/DL (ref 0.7–1.2)
GFR, ESTIMATED: 81 ML/MIN/1.73M2
GLUCOSE SERPL-MCNC: 79 MG/DL (ref 74–99)
HDLC SERPL-MCNC: 74 MG/DL
LDLC SERPL CALC-MCNC: 71 MG/DL
POTASSIUM SERPL-SCNC: 4.9 MMOL/L (ref 3.5–5)
PROT SERPL-MCNC: 6.5 G/DL (ref 6.4–8.3)
SODIUM SERPL-SCNC: 142 MMOL/L (ref 132–146)
TRIGL SERPL-MCNC: 46 MG/DL
TSH SERPL DL<=0.05 MIU/L-ACNC: 1.47 UIU/ML (ref 0.27–4.2)
VLDLC SERPL CALC-MCNC: 9 MG/DL

## 2025-06-04 ENCOUNTER — TELEPHONE (OUTPATIENT)
Dept: CARDIOLOGY CLINIC | Age: 89
End: 2025-06-04

## 2025-06-05 ENCOUNTER — OFFICE VISIT (OUTPATIENT)
Dept: CARDIOLOGY CLINIC | Age: 89
End: 2025-06-05

## 2025-06-05 VITALS
BODY MASS INDEX: 22.7 KG/M2 | DIASTOLIC BLOOD PRESSURE: 64 MMHG | RESPIRATION RATE: 18 BRPM | WEIGHT: 149.8 LBS | HEART RATE: 59 BPM | SYSTOLIC BLOOD PRESSURE: 126 MMHG | HEIGHT: 68 IN

## 2025-06-05 DIAGNOSIS — I44.0 FIRST DEGREE AV BLOCK: ICD-10-CM

## 2025-06-05 DIAGNOSIS — R00.1 SYMPTOMATIC BRADYCARDIA: Primary | ICD-10-CM

## 2025-06-05 DIAGNOSIS — I10 ESSENTIAL HYPERTENSION: ICD-10-CM

## (undated) DEVICE — STERILE POLYISOPRENE POWDER-FREE SURGICAL GLOVES: Brand: PROTEXIS

## (undated) DEVICE — 3 ML SYRINGE LUER-LOCK TIP: Brand: MONOJECT

## (undated) DEVICE — PEN: MARKING STD 100/CS: Brand: MEDICAL ACTION INDUSTRIES

## (undated) DEVICE — EYE PAK* 1043 NON-WOVEN OPHTHALMIC DRAPE APERTURE POUCH: Brand: ALCON EYE-PAK

## (undated) DEVICE — Device: Brand: MALYUGIN RING SYSTEM 7.0MM

## (undated) DEVICE — SOLUTION IV IRRIG 1000ML POUR BTL 2F7114

## (undated) DEVICE — SOLUTION IV IRRIG WATER 1000ML POUR BRL 2F7114

## (undated) DEVICE — NEEDLE HYPO 18GA L1.5IN PNK POLYPR HUB S STL THN WALL FILL

## (undated) DEVICE — SURGICAL PROCEDURE PACK CATRCT LT EYE BASIC CUST ST JOS LF